# Patient Record
Sex: FEMALE | Race: BLACK OR AFRICAN AMERICAN | Employment: FULL TIME | URBAN - METROPOLITAN AREA
[De-identification: names, ages, dates, MRNs, and addresses within clinical notes are randomized per-mention and may not be internally consistent; named-entity substitution may affect disease eponyms.]

---
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
## 2021-10-05 ENCOUNTER — OFFICE VISIT (OUTPATIENT)
Dept: OBGYN | Age: 21
End: 2021-10-05
Payer: COMMERCIAL

## 2021-10-05 VITALS
SYSTOLIC BLOOD PRESSURE: 137 MMHG | HEART RATE: 107 BPM | TEMPERATURE: 98.1 F | BODY MASS INDEX: 37.73 KG/M2 | DIASTOLIC BLOOD PRESSURE: 82 MMHG | WEIGHT: 213 LBS

## 2021-10-05 DIAGNOSIS — Z20.2 POTENTIAL EXPOSURE TO STD: Primary | ICD-10-CM

## 2021-10-05 PROCEDURE — G8484 FLU IMMUNIZE NO ADMIN: HCPCS | Performed by: STUDENT IN AN ORGANIZED HEALTH CARE EDUCATION/TRAINING PROGRAM

## 2021-10-05 PROCEDURE — 1036F TOBACCO NON-USER: CPT | Performed by: STUDENT IN AN ORGANIZED HEALTH CARE EDUCATION/TRAINING PROGRAM

## 2021-10-05 PROCEDURE — 99203 OFFICE O/P NEW LOW 30 MIN: CPT | Performed by: STUDENT IN AN ORGANIZED HEALTH CARE EDUCATION/TRAINING PROGRAM

## 2021-10-05 PROCEDURE — G8417 CALC BMI ABV UP PARAM F/U: HCPCS | Performed by: STUDENT IN AN ORGANIZED HEALTH CARE EDUCATION/TRAINING PROGRAM

## 2021-10-05 PROCEDURE — 99204 OFFICE O/P NEW MOD 45 MIN: CPT | Performed by: STUDENT IN AN ORGANIZED HEALTH CARE EDUCATION/TRAINING PROGRAM

## 2021-10-05 PROCEDURE — G8427 DOCREV CUR MEDS BY ELIG CLIN: HCPCS | Performed by: STUDENT IN AN ORGANIZED HEALTH CARE EDUCATION/TRAINING PROGRAM

## 2021-10-05 NOTE — PROGRESS NOTES
Patientt is new to office and here today to establish care and have sti testing done. Patient denies any symptoms. Pelvic exam done by Dr. Sunshine Smith and GC/CT culture was obtained, labeled and sent to lab. Discharge instructions have been discussed with the patient by Dr. Sunshine Smith and patient voiced understanding. Patient advised to call our office with any questions or concerns.

## 2021-10-05 NOTE — PROGRESS NOTES
Gynecologic History and Physical       CHIEF COMPLAINT:  Annual, STD check    HISTORY OF PRESENT ILLNESS:      Rebecca Mg is a 22 y.o. [de-identified] female, who presents to establish care and for STD screening. She is not currently sexually actuve, but as been in the past few months and just wants to make sure. She usually does use condoms. She has never been on [facility] before. GYN: menarche at 16. Sexually active with a male partner at 24. Denies prior STDs. Never been on [facility] before. Periods are regular and come every month and last 5 days. Heavy during the first 2-3 days. Minimal cramps       Past Medical History:        Diagnosis Date    Allergic     seasonal    Hypertension        Past Surgical History:        Procedure Laterality Date    ADENOIDECTOMY      TONSILLECTOMY         Allergies:  Seasonal    Social History:    Social History     Socioeconomic History    Marital status: Single     Spouse name: Not on file    Number of children: Not on file    Years of education: Not on file    Highest education level: Not on file   Occupational History    Not on file   Tobacco Use    Smoking status: Never Smoker    Smokeless tobacco: Never Used   Vaping Use    Vaping Use: Every day    Substances: Nicotine   Substance and Sexual Activity    Alcohol use: No    Drug use: No    Sexual activity: Not on file   Other Topics Concern    Not on file   Social History Narrative    Not on file     Social Determinants of Health     Financial Resource Strain:     Difficulty of Paying Living Expenses:    Food Insecurity:     Worried About Running Out of Food in the Last Year:     Ran Out of Food in the Last Year:    Transportation Needs:     Lack of Transportation (Medical):      Lack of Transportation (Non-Medical):    Physical Activity:     Days of Exercise per Week:     Minutes of Exercise per Session:    Stress:     Feeling of Stress :    Social Connections:     Frequency of Communication with Friends and

## 2021-10-14 ENCOUNTER — OFFICE VISIT (OUTPATIENT)
Dept: INTERNAL MEDICINE | Age: 21
End: 2021-10-14
Payer: COMMERCIAL

## 2021-10-14 ENCOUNTER — CLINICAL DOCUMENTATION (OUTPATIENT)
Dept: INTERNAL MEDICINE | Age: 21
End: 2021-10-14

## 2021-10-14 VITALS
TEMPERATURE: 98.3 F | BODY MASS INDEX: 37.56 KG/M2 | HEART RATE: 96 BPM | HEIGHT: 63 IN | WEIGHT: 212 LBS | SYSTOLIC BLOOD PRESSURE: 144 MMHG | DIASTOLIC BLOOD PRESSURE: 89 MMHG

## 2021-10-14 DIAGNOSIS — Z23 INFLUENZA VACCINE ADMINISTERED: ICD-10-CM

## 2021-10-14 DIAGNOSIS — Z13.31 POSITIVE DEPRESSION SCREENING: Primary | ICD-10-CM

## 2021-10-14 DIAGNOSIS — G47.33 OSA (OBSTRUCTIVE SLEEP APNEA): ICD-10-CM

## 2021-10-14 DIAGNOSIS — Z72.89 OTHER PROBLEMS RELATED TO LIFESTYLE: ICD-10-CM

## 2021-10-14 DIAGNOSIS — Z11.59 NEED FOR HEPATITIS C SCREENING TEST: ICD-10-CM

## 2021-10-14 DIAGNOSIS — Z11.4 SCREENING FOR HIV WITHOUT PRESENCE OF RISK FACTORS: ICD-10-CM

## 2021-10-14 DIAGNOSIS — Z11.4 ENCOUNTER FOR SCREENING FOR HIV: ICD-10-CM

## 2021-10-14 DIAGNOSIS — Z00.00 ENCOUNTER FOR WELL ADULT EXAM WITHOUT ABNORMAL FINDINGS: ICD-10-CM

## 2021-10-14 DIAGNOSIS — I10 ESSENTIAL HYPERTENSION: ICD-10-CM

## 2021-10-14 PROCEDURE — 99212 OFFICE O/P EST SF 10 MIN: CPT

## 2021-10-14 PROCEDURE — G0008 ADMIN INFLUENZA VIRUS VAC: HCPCS

## 2021-10-14 PROCEDURE — 99385 PREV VISIT NEW AGE 18-39: CPT

## 2021-10-14 PROCEDURE — G8431 POS CLIN DEPRES SCRN F/U DOC: HCPCS

## 2021-10-14 PROCEDURE — 6360000002 HC RX W HCPCS

## 2021-10-14 PROCEDURE — 90686 IIV4 VACC NO PRSV 0.5 ML IM: CPT

## 2021-10-14 PROCEDURE — G8482 FLU IMMUNIZE ORDER/ADMIN: HCPCS

## 2021-10-14 RX ORDER — ADHESIVE BANDAGE 3/4"
1 BANDAGE TOPICAL DAILY
Qty: 1 EACH | Refills: 0 | Status: SHIPPED | OUTPATIENT
Start: 2021-10-14

## 2021-10-14 RX ORDER — BUPROPION HYDROCHLORIDE 150 MG/1
150 TABLET ORAL EVERY MORNING
Qty: 30 TABLET | Refills: 3 | Status: SHIPPED | OUTPATIENT
Start: 2021-10-14

## 2021-10-14 RX ORDER — AMLODIPINE BESYLATE 5 MG/1
5 TABLET ORAL DAILY
Qty: 30 TABLET | Refills: 1 | Status: SHIPPED
Start: 2021-10-14 | End: 2021-11-30 | Stop reason: SDUPTHER

## 2021-10-14 ASSESSMENT — PATIENT HEALTH QUESTIONNAIRE - PHQ9
4. FEELING TIRED OR HAVING LITTLE ENERGY: 3
7. TROUBLE CONCENTRATING ON THINGS, SUCH AS READING THE NEWSPAPER OR WATCHING TELEVISION: 0
SUM OF ALL RESPONSES TO PHQ QUESTIONS 1-9: 16
10. IF YOU CHECKED OFF ANY PROBLEMS, HOW DIFFICULT HAVE THESE PROBLEMS MADE IT FOR YOU TO DO YOUR WORK, TAKE CARE OF THINGS AT HOME, OR GET ALONG WITH OTHER PEOPLE: 1
SUM OF ALL RESPONSES TO PHQ QUESTIONS 1-9: 15
9. THOUGHTS THAT YOU WOULD BE BETTER OFF DEAD, OR OF HURTING YOURSELF: 1
2. FEELING DOWN, DEPRESSED OR HOPELESS: 2
5. POOR APPETITE OR OVEREATING: 3
8. MOVING OR SPEAKING SO SLOWLY THAT OTHER PEOPLE COULD HAVE NOTICED. OR THE OPPOSITE, BEING SO FIGETY OR RESTLESS THAT YOU HAVE BEEN MOVING AROUND A LOT MORE THAN USUAL: 0
6. FEELING BAD ABOUT YOURSELF - OR THAT YOU ARE A FAILURE OR HAVE LET YOURSELF OR YOUR FAMILY DOWN: 3
SUM OF ALL RESPONSES TO PHQ QUESTIONS 1-9: 16
SUM OF ALL RESPONSES TO PHQ9 QUESTIONS 1 & 2: 3
3. TROUBLE FALLING OR STAYING ASLEEP: 3
1. LITTLE INTEREST OR PLEASURE IN DOING THINGS: 1

## 2021-10-14 ASSESSMENT — ENCOUNTER SYMPTOMS
ABDOMINAL PAIN: 1
DIARRHEA: 0
SORE THROAT: 0
SHORTNESS OF BREATH: 1
VOMITING: 1
CHEST TIGHTNESS: 1
NAUSEA: 1
CONSTIPATION: 0

## 2021-10-14 NOTE — PATIENT INSTRUCTIONS
Thank you for coming to your follow up appointment   Please take your medications as directed and keep your follow up appointment in 11/18/21. Call our office if you have any questions or concerns at [phone]  Have blood work done prior to next appointment. Serjio Mckeon MD     Patient Education        Learning About High Blood Pressure  What is high blood pressure? Blood pressure is a measure of how hard the blood pushes against the walls of your arteries. It's normal for blood pressure to go up and down throughout the day. But if it stays up, you have high blood pressure. Another name for high blood pressure is hypertension. Two numbers tell you your blood pressure. The first number is the systolic pressure (top number). It shows how hard the blood pushes when your heart is pumping. The second number is the diastolic pressure (bottom number). It shows how hard the blood pushes between heartbeats, when your heart is relaxed and filling with blood. Your doctor will give you a goal for your blood pressure based on your health and your age. High blood pressure (hypertension) means that the top number stays high, or the bottom number stays high, or both. High blood pressure increases the risk of stroke, heart attack, and other problems. What happens when you have high blood pressure? · Blood flows through your arteries with too much force. Over time, this can damage the heart and the walls of your arteries. But you can't feel it. High blood pressure usually doesn't cause symptoms. · High blood pressure makes your heart work harder. And that can lead to heart failure, which means your heart doesn't pump as much blood as your body needs. · Fat and calcium start to build up in your arteries. This buildup is called hardening of the arteries. It can cause many problems including a heart attack and stroke. · Arteries also carry blood and oxygen to organs like your eyes, kidneys, and brain.  If high blood pressure damages those arteries, it can lead to vision loss, kidney disease, stroke, and a higher risk of dementia. How can you prevent high blood pressure? · Stay at a healthy weight. · Try to limit how much sodium you eat to less than 2,300 milligrams (mg) a day. If you limit your sodium to 1,500 mg a day, you can lower your blood pressure even more. ? Buy foods that are labeled \"unsalted,\" \"sodium-free,\" or \"low-sodium. \" Foods labeled \"reduced-sodium\" and \"light sodium\" may still have too much sodium. ? Flavor your food with garlic, lemon juice, onion, vinegar, herbs, and spices instead of salt. Do not use soy sauce, steak sauce, onion salt, garlic salt, mustard, or ketchup on your food. ? Use less salt (or none) when recipes call for it. You can often use half the salt a recipe calls for without losing flavor. · Be physically active. Get at least 30 minutes of exercise on most days of the week. Walking is a good choice. You also may want to do other activities, such as running, swimming, cycling, or playing tennis or team sports. · Limit alcohol to 2 drinks a day for men and 1 drink a day for women. · Eat plenty of fruits, vegetables, and low-fat dairy products. Eat less saturated and total fats. How is high blood pressure treated? · Your doctor will suggest making lifestyle changes to help your heart. For example, your doctor may ask you to eat healthy foods, quit smoking, lose extra weight, and be more active. · If lifestyle changes don't help enough, your doctor may recommend that you take medicine. · When blood pressure is very high, medicines are needed to lower it. Follow-up care is a key part of your treatment and safety. Be sure to make and go to all appointments, and call your doctor if you are having problems. It's also a good idea to know your test results and keep a list of the medicines you take. Where can you learn more? Go to [email or web address]://[email or web address].[email or web address]. org and sign in to your [employer] account. Enter P501 in the [employer] [place] box to learn more about \"Learning About High Blood Pressure. \"     If you do not have an account, please click on the \"Sign Up Now\" link. Current as of: April 29, 2021               Content Version: 13.0  © 6840-6783 Healthwise, Incorporated. Care instructions adapted under license by [facility] ([facility]). If you have questions about a medical condition or this instruction, always ask your healthcare professional. [address] any warranty or liability for your use of this information.

## 2021-10-14 NOTE — PROGRESS NOTES
Discharge instructions reviewed with patient per Dr. James Tinajero. Follow up appt scheduled and AVS given to patient.     Flu vaccine given IM right deltoid

## 2021-10-14 NOTE — PROGRESS NOTES
[facility] [address]  Internal Medicine Residency Program  [facility] Note      SUBJECTIVE:  CC: had concerns including New Patient and Referral - General (asking for referral for cardiology). HPI:  Gladis Owen is a 22 y. o.female with PMH of HTN, depression, and seasonal allergies presenting to [facility] to establish care. According to the patient, she has been having chest pain nearly everyday, describes it as sometimes stabbing, sometimes pressure-like, localized in the upper chest, associated with nausea and headaches. This is usually triggered when she is stressed or overwhelmed. Patient states that she also has no appetite, sometimes she has to force herself to eat. She states that this started when quarantined started, when she wasn't going out and meeting other people. Patient currently has a PHQ-9 score of 16 (moderately severe). She states she is open to see a counselor and start on anti-depressants. Patient also states that she might have sleep apnea. STOP-BANG score:4 (high risk). She is willing to have sleep studies done. Health maintenance:  Patient agreed for hep C, HIV screen and K, Crea monitoring, along with flu vaccination today    PMHx: hypertension - previously on amlodipine 5mg daily, stopped taking for 2 years since she moved to [place]. depression - not on any anti-depressantx, was previously seeing a psychiatrist; however, her psychiatrist moved to a different place and she refused to see a new one    FHx significant for HTN and DM on father side    SHx: uses vape everyday, non-alcoholic drinker, uses marijuana 5-6x/week (has used more previously), drinks coffee/tea occasionally    Review of Systems   Constitutional: Positive for appetite change and fatigue. Negative for fever. HENT: Negative for sore throat. Respiratory: Positive for chest tightness and shortness of breath. Cardiovascular: Positive for chest pain. Negative for palpitations and leg swelling. Gastrointestinal: Positive for abdominal pain, nausea and vomiting. Negative for constipation and diarrhea. Endocrine: Negative. Genitourinary: Negative for dyspareunia and menstrual problem. Musculoskeletal: Positive for arthralgias. Negative for joint swelling. Skin: Negative. Allergic/Immunologic: Positive for environmental allergies. Negative for food allergies. Neurological: Negative for dizziness and light-headedness. Hematological: Bruises/bleeds easily. Psychiatric/Behavioral: Negative for decreased concentration, self-injury and suicidal ideas. The patient is not nervous/anxious. Outpatient Medications Marked as Taking for the 10/14/21 encounter (Office Visit) with Edita Figueroa MD   Medication Sig Dispense Refill    amLODIPine (NORVASC) 5 MG tablet Take 1 tablet by mouth daily 30 tablet 1    buPROPion (WELLBUTRIN XL) 150 MG extended release tablet Take 1 tablet by mouth every morning 30 tablet 3    Blood Pressure Monitoring (BLOOD PRESSURE CUFF) MISC 1 Device by Does not apply route daily 1 each 0       OBJECTIVE:    VS:   BP (!) 144/89   Pulse 96   Temp 98.3 °F (36.8 °C) (Temporal)   Ht 5' 3\" (1.6 m)   Wt 212 lb (96.2 kg)   LMP 09/24/2021 (Exact Date)   HC 16 cm (6.3\")   BMI 37.55 kg/m²     EXAM:  Physical Exam  Constitutional:       Appearance: Normal appearance. She is obese. HENT:      Head: Normocephalic and atraumatic. Right Ear: External ear normal.      Left Ear: External ear normal.      Nose: Nose normal.      Mouth/Throat:      Mouth: Mucous membranes are moist.      Pharynx: Oropharynx is clear. Eyes:      Extraocular Movements: Extraocular movements intact. Conjunctiva/sclera: Conjunctivae normal.      Pupils: Pupils are equal, round, and reactive to light. Cardiovascular:      Rate and Rhythm: Normal rate and regular rhythm. Pulses: Normal pulses. Heart sounds: Normal heart sounds.    Pulmonary:      Effort: Pulmonary effort is normal.      Breath sounds: Normal breath sounds. Abdominal:      General: Abdomen is flat. Bowel sounds are normal.      Palpations: Abdomen is soft. Musculoskeletal:         General: Normal range of motion. Cervical back: Normal range of motion and neck supple. Skin:     General: Skin is warm. Capillary Refill: Capillary refill takes less than 2 seconds. Neurological:      General: No focal deficit present. Mental Status: She is alert and oriented to person, place, and time. Mental status is at baseline. Psychiatric:         Mood and Affect: Mood normal.         Behavior: Behavior normal.         Thought Content: Thought content normal.         Judgment: Judgment normal.         ASSESSMENT/PLAN:  I have reviewed all pertinent PMH, PSH, FH, SH, medications and allergies and updated history as appropriate. Luis Burciaga was seen today for new patient and referral - general.    Diagnoses and all orders for this visit:    Positive depression screening  - Positive Screen for Clinical Depression with a Documented Follow-up Plan   - Start buPROPion (WELLBUTRIN XL) 150 MG extended release tablet; Take 1 tablet by mouth every morning  - Won Gallegos LISW, [facility] [place] [facility] Only    Essential hypertension  - COMPREHENSIVE METABOLIC PANEL; Future  - Resume amLODIPine (NORVASC) 5 MG tablet; Take 1 tablet by mouth daily  - Blood Pressure Monitoring (BLOOD PRESSURE CUFF) MISC; 1 Device by Does not apply route daily  - Monitor blood pressures daily    AYUSH (obstructive sleep apnea)  - Baseline Diagnostic Sleep Study; Future    Encounter for screening for HIV  - HIV Screen; Future    Need for hepatitis C screening test  - Hepatitis C Antibody;  Future    Influenza vaccine administered  - INFLUENZA, QUADV, 3 YRS AND OLDER, IM PF, PREFILL SYR OR SDV, 0.5ML (AFLURIA QUADV, PF)    Encounter for well adult exam without abnormal findings     Other problems related to lifestyle  - Hepatitis C Antibody; Future    Screening for HIV without presence of risk factor  - HIV Screen; Future    RTC:  Follow-up in 1 month with labs (11/18/21).     I have reviewed my findings and recommendations with Rebecca Mg and Dr Lord Vu MD PGY-1  10/14/2021 4:51 PM

## 2021-10-14 NOTE — PROGRESS NOTES
MARIZOLW completed assessment, pt was cooperative and friendly throughout the assessment process. Pt reports moving back from [facility] 3 weeks ago. Pt is currently living with her mother and 3 younger siblings. Pt reports she left a negative situation and is glad to be back home. Pt identified feeling depressed as a result. Pt denies SI, denies HI, and denies hallucinations. Pt denies substance use and denies alcohol use. Pt reports a hx of treating at [place] in high school for anxiety and depression. Pt was agreeable to scheduling with [facility].      Pt scheduled for 10/21 at 1:00 pm in office    SW staffed with PCP

## 2021-10-14 NOTE — PROGRESS NOTES
[facility] [address]  Internal Medicine Residency Clinic    Attending Physician Statement  I have discussed the case, including pertinent history and exam findings with the resident physician. I have seen and examined the patient and the key elements of the encounter have been performed by me. I agree with the assessment, plan and orders as documented by the resident. I have reviewed all pertinent PMHx, PSHx, FamHx, SocialHx, medications, and allergies and updated history as appropriate. Patient here for new patient appointment to establish care. HTN: restarted amlodipine today    Depression: PHQ 9 score of 19; no SI/HI; no AH/VH; patient has multiple stressors occurring in her life after moving from [facility] to [facility]; patient unwilling to discuss situation with Krys Bacon today but stated she \"was not in a good situation\"; starting patient on wellbutrin 150 mg daily and patient will be seeing Silas Shepherd for counseling    Remainder of medical problems as per resident note.     [address] DO  10/14/2021 2:51 PM    Encounter time including independent chart review, discussion with patient, interpreting test results and/or external communications: 61'

## 2021-10-15 ENCOUNTER — TELEPHONE (OUTPATIENT)
Dept: INTERNAL MEDICINE | Age: 21
End: 2021-10-15

## 2021-10-15 NOTE — TELEPHONE ENCOUNTER
----- Message from Wally Reese sent at 10/15/2021 12:58 PM EDT -----  Subject: Message to Provider    QUESTIONS  Information for Provider? Patient is calling to let her PCP Dr Isael Khan know   that her insurance doesn't cover the BP cuff she ordered through the   pharmacy. Can this order be sent somewhere else? Please advise  ---------------------------------------------------------------------------  --------------  CALL BACK INFO  What is the best way for the office to contact you? OK to leave message on   voicemail  Preferred Call Back Phone Number? [phone]  ---------------------------------------------------------------------------  --------------  SCRIPT ANSWERS  Relationship to Patient?  Self

## 2021-10-18 ENCOUNTER — TELEPHONE (OUTPATIENT)
Dept: INTERNAL MEDICINE | Age: 21
End: 2021-10-18

## 2021-10-18 NOTE — TELEPHONE ENCOUNTER
bp monitor script escribed to pharmacy and can not be filled there  New script placed by dr mariela Campos and message left for pt to return call for options as to where she can have this script filled at

## 2021-10-20 ENCOUNTER — TELEPHONE (OUTPATIENT)
Dept: INTERNAL MEDICINE | Age: 21
End: 2021-10-20

## 2021-10-20 NOTE — TELEPHONE ENCOUNTER
Left patient a voicemail message due to needing to ask the questions on the sleep study questionnaire.

## 2021-10-21 ENCOUNTER — TELEPHONE (OUTPATIENT)
Dept: INTERNAL MEDICINE | Age: 21
End: 2021-10-21

## 2021-10-21 DIAGNOSIS — I10 ESSENTIAL HYPERTENSION: ICD-10-CM

## 2021-10-21 NOTE — TELEPHONE ENCOUNTER
----- Message from Manan Shah sent at 10/21/2021 11:27 AM EDT -----  Subject: Message to Provider    QUESTIONS  Information for Provider? patient wants to  prescribed cuffs for   blood pressure at a medical place, please call patient back   ---------------------------------------------------------------------------  --------------  [address]  What is the best way for the office to contact you? OK to leave message on   voicemail  Preferred Call Back Phone Number? [phone]  ---------------------------------------------------------------------------  --------------  SCRIPT ANSWERS  Relationship to Patient?  Self

## 2021-10-21 NOTE — PROGRESS NOTES
Pt requests her printed script to take to medical supply  Reprinted order under dr Aylsha Perrin name per his vo as ordering prescriber not available

## 2021-10-25 ENCOUNTER — TELEPHONE (OUTPATIENT)
Dept: INTERNAL MEDICINE | Age: 21
End: 2021-10-25

## 2021-10-25 NOTE — TELEPHONE ENCOUNTER
LISW contacted pt related to missed appointment. SW provided contact information for return call to reschedule.

## 2021-11-01 ENCOUNTER — OFFICE VISIT (OUTPATIENT)
Dept: INTERNAL MEDICINE | Age: 21
End: 2021-11-01
Payer: COMMERCIAL

## 2021-11-01 DIAGNOSIS — F43.10 POST TRAUMATIC STRESS DISORDER (PTSD): Primary | ICD-10-CM

## 2021-11-01 PROCEDURE — 90791 PSYCH DIAGNOSTIC EVALUATION: CPT | Performed by: SOCIAL WORKER

## 2021-11-01 ASSESSMENT — PATIENT HEALTH QUESTIONNAIRE - PHQ9
SUM OF ALL RESPONSES TO PHQ QUESTIONS 1-9: 19
8. MOVING OR SPEAKING SO SLOWLY THAT OTHER PEOPLE COULD HAVE NOTICED. OR THE OPPOSITE, BEING SO FIGETY OR RESTLESS THAT YOU HAVE BEEN MOVING AROUND A LOT MORE THAN USUAL: 0
4. FEELING TIRED OR HAVING LITTLE ENERGY: 3
SUM OF ALL RESPONSES TO PHQ9 QUESTIONS 1 & 2: 6
2. FEELING DOWN, DEPRESSED OR HOPELESS: 3
5. POOR APPETITE OR OVEREATING: 3
6. FEELING BAD ABOUT YOURSELF - OR THAT YOU ARE A FAILURE OR HAVE LET YOURSELF OR YOUR FAMILY DOWN: 3
3. TROUBLE FALLING OR STAYING ASLEEP: 3
10. IF YOU CHECKED OFF ANY PROBLEMS, HOW DIFFICULT HAVE THESE PROBLEMS MADE IT FOR YOU TO DO YOUR WORK, TAKE CARE OF THINGS AT HOME, OR GET ALONG WITH OTHER PEOPLE: 2
SUM OF ALL RESPONSES TO PHQ QUESTIONS 1-9: 18
9. THOUGHTS THAT YOU WOULD BE BETTER OFF DEAD, OR OF HURTING YOURSELF: 1
7. TROUBLE CONCENTRATING ON THINGS, SUCH AS READING THE NEWSPAPER OR WATCHING TELEVISION: 0
SUM OF ALL RESPONSES TO PHQ QUESTIONS 1-9: 19
1. LITTLE INTEREST OR PLEASURE IN DOING THINGS: 3

## 2021-11-01 ASSESSMENT — ANXIETY QUESTIONNAIRES
4. TROUBLE RELAXING: 0
2. NOT BEING ABLE TO STOP OR CONTROL WORRYING: 2
GAD7 TOTAL SCORE: 10
1. FEELING NERVOUS, ANXIOUS, OR ON EDGE: 1
7. FEELING AFRAID AS IF SOMETHING AWFUL MIGHT HAPPEN: 2
6. BECOMING EASILY ANNOYED OR IRRITABLE: 2
5. BEING SO RESTLESS THAT IT IS HARD TO SIT STILL: 1
IF YOU CHECKED OFF ANY PROBLEMS ON THIS QUESTIONNAIRE, HOW DIFFICULT HAVE THESE PROBLEMS MADE IT FOR YOU TO DO YOUR WORK, TAKE CARE OF THINGS AT HOME, OR GET ALONG WITH OTHER PEOPLE: SOMEWHAT DIFFICULT
3. WORRYING TOO MUCH ABOUT DIFFERENT THINGS: 2

## 2021-11-01 ASSESSMENT — COLUMBIA-SUICIDE SEVERITY RATING SCALE - C-SSRS
6. HAVE YOU EVER DONE ANYTHING, STARTED TO DO ANYTHING, OR PREPARED TO DO ANYTHING TO END YOUR LIFE?: NO
2. HAVE YOU ACTUALLY HAD ANY THOUGHTS OF KILLING YOURSELF?: NO
1. WITHIN THE PAST MONTH, HAVE YOU WISHED YOU WERE DEAD OR WISHED YOU COULD GO TO SLEEP AND NOT WAKE UP?: YES

## 2021-11-01 NOTE — PROGRESS NOTES
Remi Pretty MSW, LISW    Visit Date: 11/1/2021   Time of appointment:  11:08  Time spent with Patient: 40 minutes. This is patient's first appointment. Reason for Consult:  Depression     Referring Provider/PCP:    No ref. provider found  Serjio Mckeon MD      Pt provided informed consent for the behavioral health program. Discussed with patient model of service to include the limits of confidentiality (i.e. abuse reporting, suicide intervention, etc.) and short-term intervention focused approach. PRESENTING PROBLEM AND HISTORY  Vicky Ruiz is a 22 y.o. female who presents for new evaluation and treatment of depression. She has the following symptoms: feeling nervous, anxious, or on edge and feeling numb or detached. Onset of symptoms was approximately 3 months ago. Symptoms have been unchanged since that time. She denies current suicidal and homicidal ideation. Family history significant for no psychiatric illness. Risk factors: none. Previous treatment includes Wellbutrin. She complains of the following medication side effects: none. Pt reports challenges with depression, anxiety, nightmares, and flashbacks. MENTAL STATUS EXAM  Mood was euthymic with congruent affect. Suicidal ideation was denied. Homicidal ideation was denied. Hygiene was good . Dress was neat. Behavior was Within Normal Limits with No self-report of difficulties ambulating. Attitude was Cooperative, [place], Friendly and Help-seeking. Eye-contact was good. Speech: rate - WNL, rhythm -  WNL, volume - WNL  Verbalizations were goal directed. Thought processes were intact and goal-oriented without evidence of delusions, hallucinations, obsessions, or jenni; with little cognitive distortions. Associations were characterized by intact cognitive processes. Pt was oriented to person, place, time, and general circumstances;  recent:  good.   Insight and judgment were estimated to be good, AEB, a good  understanding of cyclical maladaptive patterns, and the ability to use insight to inform behavior change. CURRENT MEDICATIONS    Current Outpatient Medications:     Misc. Devices MISC, BP cuff Diagnosis:  Essential HTN ICD I-10, Disp: 1 each, Rfl: 0    amLODIPine (NORVASC) 5 MG tablet, Take 1 tablet by mouth daily, Disp: 30 tablet, Rfl: 1    buPROPion (WELLBUTRIN XL) 150 MG extended release tablet, Take 1 tablet by mouth every morning, Disp: 30 tablet, Rfl: 3    Blood Pressure Monitoring (BLOOD PRESSURE CUFF) MISC, 1 Device by Does not apply route daily, Disp: 1 each, Rfl: 0     FAMILY MEDICAL/MH HISTORY   Her family history includes Cancer in her maternal grandmother and paternal grandmother; Diabetes in her father; High Blood Pressure in her mother and sister; Other (age of onset: 28) in her mother. PATIENT MENTAL HEALTH HISTORY  Pt reports a hx of depression on and off for years    PSYCHOSOCIAL HISTORY  Current living situation: Pt currently lives with mother and three younger siblings    Work/Education: Pt currently works at [employer]: Pt reports support system is there but she does not always feel comfortable expressing self    Uatsdin/Spirituality: Pt reports she is not Taoist      DRUG AND ALCOHOL CURRENT USE/HISTORY  TOBACCO:  She reports that she has never smoked. She has never used smokeless tobacco.  ALCOHOL:  She reports no history of alcohol use. OTHER SUBSTANCES: She reports no history of drug use. ASSESSMENT  Ricco Flowers presented to the appointment today for evaluation and treatment of symptoms of anxiety and depression. She is currently deemed no risk to herself or others and meets criteria for PTSD. She will benefit from a medication evaluation to assess if  medications could be helpful in treating symptoms. Pt's symptoms are well controlled at this time.   She will also benefit from brief and solution-focused

## 2021-11-11 ENCOUNTER — OFFICE VISIT (OUTPATIENT)
Dept: INTERNAL MEDICINE | Age: 21
End: 2021-11-11
Payer: COMMERCIAL

## 2021-11-11 DIAGNOSIS — F43.10 POST TRAUMATIC STRESS DISORDER (PTSD): Primary | ICD-10-CM

## 2021-11-11 PROCEDURE — 90837 PSYTX W PT 60 MINUTES: CPT | Performed by: SOCIAL WORKER

## 2021-11-11 NOTE — PROGRESS NOTES
ADULT BEHAVIORAL HEALTH FOLLOW UP  Saul Gifford      Visit Date: 11/11/2021   Time of appointment:  2:30  Time spent with Patient: 60 minutes. This is patient's second appointment. Reason for Consult:  Depression     Referring Provider/PCP:    No ref. provider found  Paul Garcia MD      Pt provided informed consent for the behavioral health program. Discussed with patient model of service to include the limits of confidentiality (i.e. abuse reporting, suicide intervention, etc.) and short-term intervention focused approach. Pt indicated understanding. Vicky Cabral is a 22 y.o. female who presents for follow up of ptsd. Pt reports continued depression and challenges motivating self to do anything outside of work. Pt denies SI, denies HI, and denies hallucinations. MENTAL STATUS EXAM  Mood was depressed with congruent affect. Suicidal ideation was denied. Homicidal ideation was denied. Hygiene was good . Dress was neat. Behavior was Within Normal Limits with No self-report of difficulties ambulating. Attitude was Cooperative, [place] and [employer] was good. Speech: rate - WNL, rhythm - WNL, volume - WNL. Verbalizations were goal directed. Thought processes were intact and goal-oriented without evidence of delusions, hallucinations, obsessions, or jenni; with little cognitive distortions. Associations were characterized by intact cognitive processes. Pt was oriented to person, place, time, and general circumstances;  recent:  good. Insight and judgment were estimated to be good, AEB, a good  understanding of cyclical maladaptive patterns, and the ability to use insight to inform behavior change. ASSESSMENT  Sherie Ruiz presented to the appointment today for evaluation and treatment of symptoms of ptsd. She is currently deemed no risk to herself or others and meets criteria for ptsd. Pt was in agreement with recommendations.  Pt reports continued depression and challenges expressing self when feeling upset. LISW and pt explored and discussed triggers to isolation and depression. Pt and SW practiced grounding techniques and small ways to improve mood throughout the course of the day. Pt did agree to work on those skills before next session. LISW discussed safety planning. Pt reports at times she has thought about SI with no plan or intent. LISW educated pt if these thoughts worsen to present to Emergency Department for an evaluation pt agreed to plan. Pt currently denies SI. LISW and pt continued to discuss and explore strengths pt has and ways to practice coping skills. Pt was help-seeking throughout the session and expressed self openly. PHQ Scores 11/1/2021 10/14/2021 10/15/2018 10/13/2017 6/28/2016   PHQ2 Score 6 3 1 1 2   PHQ9 Score 19 16 5 6 13     Interpretation of Total Score Depression Severity: 1-4 = Minimal depression, 5-9 = Mild depression, 10-14 = Moderate depression, 15-19 = Moderately severe depression, 20-27 = Severe depression    How often pt has had thoughts of death or hurting self (if PHQ positive for depression):       CATARINA 7 SCORE 11/1/2021   CATARINA-7 Total Score 10     Interpretation of CATARINA-7 score: 5-9 = mild anxiety, 10-14 = moderate anxiety, 15+ = severe anxiety. Recommend referral to behavioral health for scores 10 or greater. DIAGNOSIS  Jocelynpavan was seen today for depression. Diagnoses and all orders for this visit:    Post traumatic stress disorder (PTSD)          INTERVENTION  Discussed and processed irrational thinking  Identified strengths related to progress  Completed safety planning related to PTSD symptoms  Reviewed session with client      PLAN  Pt to journal feelings and thought patterns  Pt to log mood      INTERACTIVE COMPLEXITY  Is interactive complexity present?   No  Reason:  N/A  Additional Supporting Information:  N/A       Electronically signed by PRIYA Pearson on 11/11/21 at 2:36 PM EST

## 2021-11-30 ENCOUNTER — TELEPHONE (OUTPATIENT)
Dept: INTERNAL MEDICINE | Age: 21
End: 2021-11-30

## 2021-11-30 ENCOUNTER — HOSPITAL ENCOUNTER (OUTPATIENT)
Age: 21
Discharge: HOME | End: 2021-11-30
Payer: COMMERCIAL

## 2021-11-30 ENCOUNTER — OFFICE VISIT (OUTPATIENT)
Dept: INTERNAL MEDICINE | Age: 21
End: 2021-11-30
Payer: COMMERCIAL

## 2021-11-30 VITALS
TEMPERATURE: 97.9 F | DIASTOLIC BLOOD PRESSURE: 80 MMHG | OXYGEN SATURATION: 100 % | WEIGHT: 221 LBS | RESPIRATION RATE: 20 BRPM | HEIGHT: 63 IN | HEART RATE: 90 BPM | SYSTOLIC BLOOD PRESSURE: 132 MMHG | BODY MASS INDEX: 39.16 KG/M2

## 2021-11-30 DIAGNOSIS — Z11.4 ENCOUNTER FOR SCREENING FOR HIV: ICD-10-CM

## 2021-11-30 DIAGNOSIS — Z11.59 NEED FOR HEPATITIS C SCREENING TEST: ICD-10-CM

## 2021-11-30 DIAGNOSIS — I10 ESSENTIAL HYPERTENSION: ICD-10-CM

## 2021-11-30 DIAGNOSIS — Z72.89 OTHER PROBLEMS RELATED TO LIFESTYLE: ICD-10-CM

## 2021-11-30 DIAGNOSIS — Z11.4 SCREENING FOR HIV WITHOUT PRESENCE OF RISK FACTORS: ICD-10-CM

## 2021-11-30 DIAGNOSIS — F43.10 POST TRAUMATIC STRESS DISORDER (PTSD): Primary | ICD-10-CM

## 2021-11-30 DIAGNOSIS — F32.A DEPRESSION, UNSPECIFIED DEPRESSION TYPE: ICD-10-CM

## 2021-11-30 LAB
ALBUMIN SERPL-MCNC: 4.3 G/DL (ref 3.5–5.2)
ALP BLD-CCNC: 45 U/L (ref 35–104)
ALT SERPL-CCNC: 19 U/L (ref 0–32)
ANION GAP SERPL CALCULATED.3IONS-SCNC: 12 MMOL/L (ref 7–16)
AST SERPL-CCNC: 18 U/L (ref 0–31)
BILIRUB SERPL-MCNC: 0.3 MG/DL (ref 0–1.2)
BUN BLDV-MCNC: 11 MG/DL (ref 6–20)
CALCIUM SERPL-MCNC: 8.9 MG/DL (ref 8.6–10.2)
CHLORIDE BLD-SCNC: 105 MMOL/L (ref 98–107)
CO2: 24 MMOL/L (ref 22–29)
CREAT SERPL-MCNC: 0.7 MG/DL (ref 0.5–1)
GFR AFRICAN AMERICAN: >60
GFR NON-AFRICAN AMERICAN: >60 ML/MIN/1.73
GLUCOSE BLD-MCNC: 81 MG/DL (ref 74–99)
POTASSIUM SERPL-SCNC: 4.2 MMOL/L (ref 3.5–5)
SODIUM BLD-SCNC: 141 MMOL/L (ref 132–146)
TOTAL PROTEIN: 7 G/DL (ref 6.4–8.3)

## 2021-11-30 PROCEDURE — 86703 HIV-1/HIV-2 1 RESULT ANTBDY: CPT

## 2021-11-30 PROCEDURE — G8417 CALC BMI ABV UP PARAM F/U: HCPCS | Performed by: INTERNAL MEDICINE

## 2021-11-30 PROCEDURE — G8427 DOCREV CUR MEDS BY ELIG CLIN: HCPCS | Performed by: INTERNAL MEDICINE

## 2021-11-30 PROCEDURE — G8482 FLU IMMUNIZE ORDER/ADMIN: HCPCS | Performed by: INTERNAL MEDICINE

## 2021-11-30 PROCEDURE — 99212 OFFICE O/P EST SF 10 MIN: CPT | Performed by: INTERNAL MEDICINE

## 2021-11-30 PROCEDURE — 99213 OFFICE O/P EST LOW 20 MIN: CPT | Performed by: INTERNAL MEDICINE

## 2021-11-30 PROCEDURE — 80053 COMPREHEN METABOLIC PANEL: CPT

## 2021-11-30 PROCEDURE — 90832 PSYTX W PT 30 MINUTES: CPT | Performed by: SOCIAL WORKER

## 2021-11-30 PROCEDURE — 36415 COLL VENOUS BLD VENIPUNCTURE: CPT

## 2021-11-30 PROCEDURE — 1036F TOBACCO NON-USER: CPT | Performed by: INTERNAL MEDICINE

## 2021-11-30 PROCEDURE — 86803 HEPATITIS C AB TEST: CPT

## 2021-11-30 RX ORDER — AMLODIPINE BESYLATE 5 MG/1
5 TABLET ORAL DAILY
Qty: 30 TABLET | Refills: 5 | Status: SHIPPED
Start: 2021-11-30 | End: 2022-04-21 | Stop reason: ALTCHOICE

## 2021-11-30 ASSESSMENT — ENCOUNTER SYMPTOMS
CONSTIPATION: 0
VOMITING: 0
DIARRHEA: 0
WHEEZING: 0
COUGH: 0
SINUS PRESSURE: 0
SHORTNESS OF BREATH: 0
NAUSEA: 0
SINUS PAIN: 0
BACK PAIN: 0

## 2021-11-30 NOTE — PATIENT INSTRUCTIONS
Please take meds a s prescribed  Please get blood work done  Follow up with Dr. Alyssa Parnell in 1 month    [address], DO    Patient Education        Diet and Exercise for Metabolic Syndrome: Care Instructions  Your Care Instructions     Metabolic syndrome is the name for a group of health problems. It includes having too much fat around your waist and high blood pressure. It also includes high triglycerides, high blood sugar, and low levels of healthy (HDL) cholesterol. These problems make it more likely you will have a heart attack or stroke or get diabetes. Your family history (your genes) can cause metabolic syndrome. So can unhealthy eating habits and not getting enough exercise. You can help lower your risk of heart attack, stroke, and diabetes if you eat healthy foods and get more exercise. It may be hard to make these lifestyle changes. But even small changes can help. Follow-up care is a key part of your treatment and safety. Be sure to make and go to all appointments, and call your doctor if you are having problems. It's also a good idea to know your test results and keep a list of the medicines you take. How can you care for yourself at home? Eat more fruits and vegetables  · Fruits and vegetables have nutrients to help protect you from heart disease and high blood pressure. They are low in fat and high in fiber. Dark green, orange, and yellow ones are the healthiest.  · Keep lots of vegetables ready for snacks. · Buy fruit that is in season. Then put it where you can see it so you will want to eat it. · Cook dishes that have a lot of vegetables. Soups and stir-fries are good choices. Limit saturated fats  · Read food labels, and try to avoid saturated fats. They increase your risk of heart disease. · Use olive or canola oil when you cook. · Bake, broil, grill, or steam foods. Avoid fried foods. · Limit how much high-fat meat you eat. This includes hot dogs and sausages.  When you prepare meat, cut off all the fat. · You can replace high-fat meat with fish and skinless poultry. You can also try products made from soybeans, like tofu. Soybeans may be very good for your heart. · Choose low-fat or fat-free milk and dairy products. Eat foods high in fiber  · Foods high in fiber may reduce your cholesterol. And they may give you important vitamins and minerals. Good examples are oatmeal, cooked dried beans, brown rice, citrus fruits, and apples. · Eat whole-grain breads and cereals. They have more fiber than white bread or pastries. Limit high-sugar foods  · Limit foods and drinks that are high in sugar. Some examples are soda pop, sugar-sweetened fruit drinks, candy, and many desserts. · Limit the amount of sugar, honey, and other sweeteners that you add to food and drinks. · Choose water instead of soda pop or other sugar-sweetened drinks. · Limit fruit juice. Limit salt and sodium  · You can help lower your blood pressure if you limit salt and sodium. · If you take the salt shaker off the table, it may be easier to use less salt. You can also try using half the salt in a recipe. And you can avoid adding salt to cooking water for pasta, rice, and potatoes. · Try to eat fewer snacks, fast foods, and other high-salt, processed foods. Check labels so you know how much sodium a food has. · Choose canned goods (soups, vegetables, and beans) that are low in sodium. Get regular exercise  · Get more exercise. Make sure your doctor knows when you start a new exercise program. Even small amounts of exercise will help you get stronger and have more energy. It can also help you manage your weight and your stress. · Walking is a good choice. Little by little, increase the amount you walk every day. Try for at least 30 minutes on most days of the week. Where can you learn more? Go to https://elisa.healthDataRobot. org and sign in to your [employer] account.  Enter 558 [id number] in the [address] [place] [address] Information box to learn more about \"Diet and Exercise for Metabolic Syndrome: Care Instructions. \"     If you do not have an account, please click on the \"Sign Up Now\" link. Current as of: December 17, 2020               Content Version: 13.0  © 5190-1504 Healthwise, Incorporated. Care instructions adapted under license by [facility] ([facility]). If you have questions about a medical condition or this instruction, always ask your healthcare professional. [address] any warranty or liability for your use of this information.

## 2021-11-30 NOTE — PROGRESS NOTES
[address] [place] [address],[address]  Internal Medicine Residency Clinic    Attending Physician Statement  I have discussed the case, including pertinent history and exam findings with the resident physician. I agree with the assessment, plan and orders as documented by the resident. I have reviewed all pertinent PMHx, PSHx, FamHx, SocialHx, medications, and allergies and updated history as appropriate. Patient presents for routine follow up of medical problems. HTN - controlled on current medications. Continue amlodipine   Await lab results    Depression - patient still with symptoms though did not want to discuss with resident today. Continue follow-up with PRIYA Farnsworth   Adjust medications and consider psychiatry referral if no improvement in mood/affect at next visit. Remainder of medical problems as per resident note.     Raimundo Bronson MD  11/30/2021 12:06 PM

## 2021-11-30 NOTE — PROGRESS NOTES
[address]  InternalMedicine Residency Program  [facility] Note    54  SUBJECTIVE:  CC: had concerns including Hypertension. HPI:Emma Blount presented to the [facility] for a routine visit. PMHx of  has a past medical history of Allergic, Depression, and Hypertension. Patient presents for regular follow up and med refill. CC: seasonal allergies-pollen dust mold; rhinorrhea; burning itchy eyes; no fever, chills, no cough, no sputum production. Follows with Dr. Quinton Dance (allergy-immunology specialist)    HTN: on amlodipine 5mg; 132/80 today but she did not take her meds yet. Has a cuff at home but does not check BP. Counseled about maintaining a log  Depression: was started on wellbutrin last visit; no change in symptoms; Follows with Ms. Yann Christianson; recommeded Trauma therapist for PTSD. Patient unwilling to discuss her symptoms/past with me today. Did not get blood work done. She will get it done in the next few weeks. [employer] 2/8 ()    Smokes Mileds 1to 2 daily; rare alcohol; daily marijuana use. Works at [employer]. From [facility] [address]; moved to [facility] due to family. Did not covid vaccine; got flu vaccine this year. Review of Systems   Constitutional: Negative for activity change, appetite change, chills and fever. HENT: Negative for sinus pressure, sinus pain and sneezing. Respiratory: Negative for cough, shortness of breath and wheezing. Cardiovascular: Negative for chest pain, palpitations and leg swelling. Gastrointestinal: Negative for constipation, diarrhea, nausea and vomiting. Endocrine: Negative for polydipsia, polyphagia and polyuria. Genitourinary: Negative for difficulty urinating, dysuria and hematuria. Musculoskeletal: Negative for arthralgias, back pain and myalgias. Neurological: Negative for dizziness, seizures, syncope and light-headedness. Psychiatric/Behavioral: Negative for dysphoric mood and sleep disturbance.  The patient is not Social Hx, medications, and allergies andupdated history as appropriate. Florence Burns was seen today for hypertension. Diagnoses and all orders for this visit:    Essential hypertension  -     amLODIPine (NORVASC) 5 MG tablet; Take 1 tablet by mouth daily    Depression, unspecified depression type    Please take meds a s prescribed  Please get blood work done  Follow up with Dr. Adelaida De Loen in 1 month    RTC:     I have reviewed my findings andrecommendations with Syed Lopez and attending physician.     Shelley Parkinson DO, DO PGY3  11/30/2021 12:07 PM

## 2021-11-30 NOTE — TELEPHONE ENCOUNTER
MARIZOLW left message for pt related to appointment. LISW provided contact information for return call.

## 2021-11-30 NOTE — PROGRESS NOTES
ADULT BEHAVIORAL HEALTH FOLLOW UP  Gerry Salcedo      Visit Date: 11/30/2021   Time of appointment:  10:30  Time spent with Patient: 30  minutes. This is patient's third appointment. Reason for Consult:  Depression     Referring Provider/PCP:    No ref. provider found  Vesta Primrose, MD      Pt provided informed consent for the behavioral health program. Discussed with patient model of service to include the limits of confidentiality (i.e. abuse reporting, suicide intervention, etc.) and short-term intervention focused approach. Pt indicated understanding. Oren Tate is a 22 y.o. female who presents for follow up of PTSD. Pt reports progress with mood and using coping skills. MENTAL STATUS EXAM  Mood was euthymic with congruent affect. Suicidal ideation was denied. Homicidal ideation was denied. Hygiene was good . Dress was neat. Behavior was Within Normal Limits with No self-report of difficulties ambulating. Attitude was Cooperative, [place] and Friendly. Eye-contact was good. Speech: rate - WNL, rhythm - WNL, volume - WNL. Verbalizations were goal directed. Thought processes were intact and goal-oriented without evidence of delusions, hallucinations, obsessions, or jenni; with little cognitive distortions. Associations were characterized by intact cognitive processes. Pt was oriented to person, place, time, and general circumstances;  recent:  good. Insight and judgment were estimated to be good, AEB, a good  understanding of cyclical maladaptive patterns, and the ability to use insight to inform behavior change. ASSESSMENT  Chao Heredia presented to the appointment today for evaluation and treatment of symptoms of PSTD. She is currently deemed no risk to herself or others and meets criteria for PTSD. Pt was in agreement with recommendations. Discussed and processed with pt progress with overall mood.  Explored with pt continued ways to express self and work towards making small changes to improve overall mood. Pt was agreeable and help-seeking throughout the session. PHQ Scores 11/1/2021 10/14/2021 10/15/2018 10/13/2017 6/28/2016   PHQ2 Score 6 3 1 1 2   PHQ9 Score 19 16 5 6 13     Interpretation of Total Score Depression Severity: 1-4 = Minimal depression, 5-9 = Mild depression, 10-14 = Moderate depression, 15-19 = Moderately severe depression, 20-27 = Severe depression    How often pt has had thoughts of death or hurting self (if PHQ positive for depression):       CATARINA 7 SCORE 11/1/2021   CATARINA-7 Total Score 10     Interpretation of CATARINA-7 score: 5-9 = mild anxiety, 10-14 = moderate anxiety, 15+ = severe anxiety. Recommend referral to behavioral health for scores 10 or greater. DIAGNOSIS  Emma was seen today for depression. Diagnoses and all orders for this visit:    Post traumatic stress disorder (PTSD)          INTERVENTION  Used CBT to address thinking patterns  Identified continued ways to use coping skills to improve mood      PLAN  Pt to be seen in one week  Pt to continue to practice coping skills and challenge irrational thinking pattern      INTERACTIVE COMPLEXITY  Is interactive complexity present?   No  Reason:  N/A  Additional Supporting Information:  N/A       Electronically signed by Mili Nuñez on 11/30/21 at 1:56 PM EST

## 2021-12-01 LAB
HEPATITIS C ANTIBODY INTERPRETATION: NORMAL
HIV-1 AND HIV-2 ANTIBODIES: NORMAL

## 2021-12-07 ENCOUNTER — TELEPHONE (OUTPATIENT)
Dept: INTERNAL MEDICINE | Age: 21
End: 2021-12-07

## 2021-12-07 NOTE — TELEPHONE ENCOUNTER
LISW left message for pt related to missed appointment. LISW provided contact information for return call.

## 2022-01-06 ENCOUNTER — TELEPHONE (OUTPATIENT)
Dept: INTERNAL MEDICINE | Age: 22
End: 2022-01-06

## 2022-04-20 NOTE — PROGRESS NOTES
[facility] Internal Medicine      SUBJECTIVE:  Mine Sandoval (:  2000) is a 22 y.o. female here for evaluation of the following chief complaint(s):  Follow-up (states having chest pain on and on for 4 days) and Health Maintenance (declines Covid vaccine at present)    Last seen on 2021    Chest pain  · She has had intermittent chest pain for the past 4 days described as sharp/burning pain on the right side radiating to the back. She states this pain is worse on deep inspiration especially when she takes a deep breath when smoking her vape or marijuana blunts  · No history of heart disease  · BMI 38  · Vapes everyday > pods refilled every 2-3 weeks  · Marijuana daily > 1-2 blunts per day  · hba1c 5.3 on 10/15/2018  ·  on 2/15/2019    Bilateral inguinal fold rash  · States she has had erythematous, pruritic rashes in her bilateral inguinal folds for the past several months    Hypertension  · BP today 124/88  · Has not been taking amlodipine for the past 4 months    Depression  · Has not been taking Wellbutrin for the past 4 months  · Follows with social work counseling    Review of Systems   Constitutional: Negative for appetite change, chills and fever. Respiratory: Negative for cough, chest tightness and shortness of breath. Cardiovascular: Positive for chest pain. Negative for palpitations and leg swelling. Gastrointestinal: Negative for abdominal pain, constipation, diarrhea, nausea and vomiting. Genitourinary: Negative. Musculoskeletal: Negative. Skin: Positive for rash (Erythematous bilateral inguinal folds). Neurological: Negative for headaches.        Current Outpatient Medications on File Prior to Visit   Medication Sig Dispense Refill    buPROPion (WELLBUTRIN XL) 150 MG extended release tablet Take 1 tablet by mouth every morning (Patient not taking: Reported on 2022) 30 tablet 3    Blood Pressure Monitoring (BLOOD PRESSURE CUFF) MISC 1 Device by Does not apply route daily (Patient not taking: Reported on 4/21/2022) 1 each 0     No current facility-administered medications on file prior to visit. OBJECTIVE:    VS:   Vitals:    04/21/22 1401   BP: 124/88   Site: Left Upper Arm   Position: Sitting   Cuff Size: Large Adult   Pulse: 79   Resp: 18   Temp: 97.2 °F (36.2 °C)   TempSrc: Temporal   SpO2: 99%   Weight: 220 lb (99.8 kg)   Height: 5' 3\" (1.6 m)     Physical Exam  Constitutional:       General: She is not in acute distress. HENT:      Head: Normocephalic and atraumatic. Eyes:      Conjunctiva/sclera: Conjunctivae normal.      Pupils: Pupils are equal, round, and reactive to light. Neck:      Vascular: No carotid bruit. Cardiovascular:      Rate and Rhythm: Normal rate and regular rhythm. Pulses: Normal pulses. Heart sounds: Normal heart sounds. Comments: Reproducible tenderness on right chest worsened with deep inspiration  Pulmonary:      Effort: Pulmonary effort is normal.      Breath sounds: Normal breath sounds. Abdominal:      General: There is no distension. Palpations: Abdomen is soft. There is no mass. Tenderness: There is no abdominal tenderness. Musculoskeletal:         General: Normal range of motion. Cervical back: Neck supple. No muscular tenderness. Skin:     General: Skin is warm and dry. Findings: Rash (Erythematous rash on bilateral inguinal folds with some satellite lesions) present. Neurological:      General: No focal deficit present. Mental Status: She is alert and oriented to person, place, and time. Mental status is at baseline. Psychiatric:         Mood and Affect: Mood normal.         Behavior: Behavior normal.         Thought Content:  Thought content normal.         Judgment: Judgment normal.            ASSESSMENT/PLAN:    Costochondritis likely 2/2 smoking  · Start ibuprofen and lidocaine patch as needed for chest pain  · Advised benefits of smoking cessation    Bilateral inguinal intertrigo  · Start clotrimazole cream twice daily  · Advised patient to keep area dry and prevent moisture. Educational information provided. Hypertension  · BP controlled today while off medications for 4 months  · Continue to monitor off medications    Anxiety/depression  · Follows with counseling    RTC:  Return in about 6 months (around 10/21/2022). I have reviewed my findings and recommendations with Oh Mcdermott and Dr. Ezra Chaudhary.     MD Judah [address]ill 1499  4/21/2022 3:33 PM

## 2022-04-21 ENCOUNTER — SOCIAL WORK (OUTPATIENT)
Dept: INTERNAL MEDICINE | Age: 22
End: 2022-04-21

## 2022-04-21 ENCOUNTER — OFFICE VISIT (OUTPATIENT)
Dept: INTERNAL MEDICINE | Age: 22
End: 2022-04-21
Payer: COMMERCIAL

## 2022-04-21 VITALS
DIASTOLIC BLOOD PRESSURE: 88 MMHG | RESPIRATION RATE: 18 BRPM | HEART RATE: 79 BPM | SYSTOLIC BLOOD PRESSURE: 124 MMHG | WEIGHT: 220 LBS | HEIGHT: 63 IN | OXYGEN SATURATION: 99 % | TEMPERATURE: 97.2 F | BODY MASS INDEX: 38.98 KG/M2

## 2022-04-21 DIAGNOSIS — Z59.9 FINANCIAL DIFFICULTIES: ICD-10-CM

## 2022-04-21 DIAGNOSIS — R07.89 COSTOCHONDRAL CHEST PAIN: ICD-10-CM

## 2022-04-21 DIAGNOSIS — L30.4 INTERTRIGO: Primary | ICD-10-CM

## 2022-04-21 PROCEDURE — 99212 OFFICE O/P EST SF 10 MIN: CPT | Performed by: INTERNAL MEDICINE

## 2022-04-21 PROCEDURE — 1036F TOBACCO NON-USER: CPT | Performed by: INTERNAL MEDICINE

## 2022-04-21 PROCEDURE — 99213 OFFICE O/P EST LOW 20 MIN: CPT | Performed by: INTERNAL MEDICINE

## 2022-04-21 PROCEDURE — G8417 CALC BMI ABV UP PARAM F/U: HCPCS | Performed by: INTERNAL MEDICINE

## 2022-04-21 PROCEDURE — G8427 DOCREV CUR MEDS BY ELIG CLIN: HCPCS | Performed by: INTERNAL MEDICINE

## 2022-04-21 RX ORDER — LIDOCAINE 4 G/G
1 PATCH TOPICAL DAILY
Qty: 30 PATCH | Refills: 0 | Status: SHIPPED | OUTPATIENT
Start: 2022-04-21 | End: 2022-05-21

## 2022-04-21 RX ORDER — CLOTRIMAZOLE 1 %
CREAM (GRAM) TOPICAL
Qty: 1 EACH | Refills: 1 | Status: SHIPPED | OUTPATIENT
Start: 2022-04-21 | End: 2022-04-28

## 2022-04-21 RX ORDER — IBUPROFEN 600 MG/1
600 TABLET ORAL 4 TIMES DAILY PRN
Qty: 40 TABLET | Refills: 0 | Status: SHIPPED | OUTPATIENT
Start: 2022-04-21

## 2022-04-21 SDOH — ECONOMIC STABILITY: HOUSING INSECURITY: IN THE LAST 12 MONTHS, HOW MANY PLACES HAVE YOU LIVED?: 4

## 2022-04-21 SDOH — ECONOMIC STABILITY - INCOME SECURITY: PROBLEM RELATED TO HOUSING AND ECONOMIC CIRCUMSTANCES, UNSPECIFIED: Z59.9

## 2022-04-21 SDOH — ECONOMIC STABILITY: HOUSING INSECURITY
IN THE LAST 12 MONTHS, WAS THERE A TIME WHEN YOU DID NOT HAVE A STEADY PLACE TO SLEEP OR SLEPT IN A SHELTER (INCLUDING NOW)?: YES

## 2022-04-21 SDOH — ECONOMIC STABILITY: INCOME INSECURITY: IN THE LAST 12 MONTHS, WAS THERE A TIME WHEN YOU WERE NOT ABLE TO PAY THE MORTGAGE OR RENT ON TIME?: NO

## 2022-04-21 SDOH — ECONOMIC STABILITY: FOOD INSECURITY: WITHIN THE PAST 12 MONTHS, THE FOOD YOU BOUGHT JUST DIDN'T LAST AND YOU DIDN'T HAVE MONEY TO GET MORE.: NEVER TRUE

## 2022-04-21 SDOH — ECONOMIC STABILITY: TRANSPORTATION INSECURITY
IN THE PAST 12 MONTHS, HAS LACK OF TRANSPORTATION KEPT YOU FROM MEETINGS, WORK, OR FROM GETTING THINGS NEEDED FOR DAILY LIVING?: NO

## 2022-04-21 SDOH — ECONOMIC STABILITY: TRANSPORTATION INSECURITY
IN THE PAST 12 MONTHS, HAS THE LACK OF TRANSPORTATION KEPT YOU FROM MEDICAL APPOINTMENTS OR FROM GETTING MEDICATIONS?: NO

## 2022-04-21 SDOH — ECONOMIC STABILITY: FOOD INSECURITY: WITHIN THE PAST 12 MONTHS, YOU WORRIED THAT YOUR FOOD WOULD RUN OUT BEFORE YOU GOT MONEY TO BUY MORE.: NEVER TRUE

## 2022-04-21 ASSESSMENT — PATIENT HEALTH QUESTIONNAIRE - PHQ9
SUM OF ALL RESPONSES TO PHQ9 QUESTIONS 1 & 2: 2
DEPRESSION UNABLE TO ASSESS: YES
1. LITTLE INTEREST OR PLEASURE IN DOING THINGS: MORE THAN HALF THE DAYS
2. FEELING DOWN, DEPRESSED OR HOPELESS: NOT AT ALL

## 2022-04-21 ASSESSMENT — ENCOUNTER SYMPTOMS
COUGH: 0
SHORTNESS OF BREATH: 0
CHEST TIGHTNESS: 0
NAUSEA: 0
VOMITING: 0
ABDOMINAL PAIN: 0
CONSTIPATION: 0
DIARRHEA: 0

## 2022-04-21 ASSESSMENT — LIFESTYLE VARIABLES
HOW MANY STANDARD DRINKS CONTAINING ALCOHOL DO YOU HAVE ON A TYPICAL DAY: 1 OR 2
HOW OFTEN DO YOU HAVE A DRINK CONTAINING ALCOHOL: MONTHLY OR LESS

## 2022-04-21 ASSESSMENT — SOCIAL DETERMINANTS OF HEALTH (SDOH): HOW HARD IS IT FOR YOU TO PAY FOR THE VERY BASICS LIKE FOOD, HOUSING, MEDICAL CARE, AND HEATING?: SOMEWHAT HARD

## 2022-04-21 NOTE — PROGRESS NOTES
[address]  Internal Medicine Residency Clinic    Attending Physician Statement  I have discussed the case, including pertinent history and exam findings with the resident physician. I agree with the assessment, plan and orders as documented by the resident. I have reviewed all pertinent PMHx, PSHx, FamHx, SocialHx, medications, and allergies and updated history as appropriate. Patient presents for routine follow up of medical problems. Pleuritic chest pain, suspect costochondritis -- 6 days duration and reproducible on palpation, recommend NSAID; if persists beyond 1 week check CXR. Tobacco abuse disorder -- vaping, cigarettes and marijuana    Hx of HTN -- off amlodipine for 4 months and BP well controlled today in office    Remainder of medical problems as per resident note.     Daniel Villatoro DO  4/21/2022 2:43 PM

## 2022-04-21 NOTE — PROGRESS NOTES
AVS printed with follow up appointment and discharge instructions given per Dr. Mai Puente. Referral entered per patient request due to concerns on social determinants.

## 2022-04-21 NOTE — PATIENT INSTRUCTIONS
Patient Education        Yeast Skin Infection: Care Instructions  Your Care Instructions     Yeast normally lives on your skin. Sometimes too much yeast can overgrow in certain areas of the skin and cause an infection. The infection causes red,scaly, moist patches on your skin that may itch. Common areas for skin yeast infections are skin folds under the breasts or belly area. The warm and moist areas in the skin folds can make it easier for yeast to overgrow. Yeast infections also can be found on other parts of thebody such as the groin or armpits. You will probably get a cream or ointment that contains an antifungal medicine. Examples of these are miconazole and clotrimazole. You put it on your skin to treat the infection. Your doctor may give you a prescription for the cream or ointment. Or you may be [facility]. If the infection is severe, the doctor will prescribe antifungal pills. A yeast infection usually goes away after about a week of treatment. But it'simportant to use the medicine for as long as your doctor tells you to. Follow-up care is a key part of your treatment and safety. Be sure to make and go to all appointments, and call your doctor if you are having problems. It's also a good idea to know your test results and keep alist of the medicines you take. How can you care for yourself at home?  Be safe with medicines. Take your medicines exactly as prescribed. Call your doctor if you think you are having a problem with your medicine.  Keep your skin clean and dry. Your doctor may suggest using powder that contains an antifungal medicine in the skin folds.  Wear loose clothing. When should you call for help? Call your doctor now or seek immediate medical care if:     You have symptoms of infection, such as:  ? Increased pain, swelling, warmth, or redness. ? Red streaks leading from the area. ? Pus draining from the area. ? A fever. Watch closely for changes in your health, and be sure to contact your doctor if:     You do not get better as expected. Where can you learn more? Go to https://chpepiceweb.Azooo. org and sign in to your [employer] account. Enter K438 in the [facility] box to learn more about \"Yeast Skin Infection: Care Instructions. \"     If you do not have an account, please click on the \"Sign Up Now\" link. Current as of: November 15, 2021               Content Version: 13.2  © 2006-2022 [employer]. Care instructions adapted under license by [facility] ([facility]). If you have questions about a medical condition or this instruction, always ask your healthcare professional. [address] any warranty or liability for your use of this information. Patient Education        Musculoskeletal Chest Pain: Care Instructions  Your Care Instructions     Chest pain is not always a sign that something is wrong with your heart or that you have another serious problem. The doctor thinks your chest pain is caused by strained muscles or ligaments, inflamed chest cartilage, or another problem in your chest, rather than by your heart. You may need more tests to find thecause of your chest pain. Follow-up care is a key part of your treatment and safety. Be sure to make and go to all appointments, and call your doctor if you are having problems. It's also a good idea to know your test results and keep alist of the medicines you take. How can you care for yourself at home?  Take pain medicines exactly as directed. ? If the doctor gave you a prescription medicine for pain, take it as prescribed. ? If you are not taking a prescription pain medicine, ask your doctor if you can take an over-the-counter medicine.  Rest and protect the sore area.  Stop, change, or take a break from any activity that may be causing your pain or soreness.    Put ice or a cold pack on the sore area for 10 to 20 minutes at a time. Try to do this every 1 to 2 hours for the next 3 days (when you are awake) or until the swelling goes down. Put a thin cloth between the ice and your skin.  After 2 or 3 days, apply a heating pad set on low or a warm cloth to the area that hurts. Some doctors suggest that you go back and forth between hot and cold.  Do not wrap or tape your ribs for support. This may cause you to take smaller breaths, which could increase your risk of lung problems.  Mentholated creams such as Bengay or Icy Hot may soothe sore muscles. Follow the instructions on the package.  Follow your doctor's instructions for exercising.  Gentle stretching and massage may help you get better faster. Stretch slowly to the point just before pain begins, and hold the stretch for at least 15 to 30 seconds. Do this 3 or 4 times a day. Stretch just after you have applied heat.  As your pain gets better, slowly return to your normal activities. Any increased pain may be a sign that you need to rest a while longer. When should you call for help? Call 911 anytime you think you may need emergency care. For example, call if:     You have chest pain or pressure. This may occur with:  ? Sweating. ? Shortness of breath. ? Nausea or vomiting. ? Pain that spreads from the chest to the neck, jaw, or one or both shoulders or arms. ? Dizziness or lightheadedness. ? A fast or uneven pulse. After calling 911, chew 1 adult-strength aspirin. Wait for an ambulance. Do not try to drive yourself.      You have sudden chest pain and shortness of breath, or you cough up blood. Call your doctor now or seek immediate medical care if:     You have any trouble breathing.      Your chest pain gets worse.      Your chest pain occurs consistently with exercise and is relieved by rest.   Watch closely for changes in your health, and be sure to contact your doctor if:     Your chest pain does not get better after 1 week.    Where can you learn more? Go to https://chpepiceweb.healthKyma [email or web address] org and sign in to your [employer] account. Enter [id number] [id number] in the [facility] box to learn more about \"Musculoskeletal Chest Pain: Care Instructions. \"     If you do not have an account, please click on the \"Sign Up Now\" link. Current as of: July 1, 2021               Content Version: 13.2  © 2006-2022 Healthwise, [employer]. Care instructions adapted under license by [facility] ([facility]). If you have questions about a medical condition or this instruction, always ask your healthcare professional. [address] any warranty or liability for your use of this information.        Partner ibuprofen and lidocaine patch as needed for chest pain  Start clotrimazole cream for bilateral inguinal rash  Stop amlodipine

## 2022-04-25 NOTE — PROGRESS NOTES
Consult during 22 IM visit related to positive SDOH r/t housing instability and pt's request for assistance with Medicaid. Met with with pt during IM appt; she was unaccompanied to visit and travelled via car. Pt is single, never  and has no children. Pt born and raised in [place]; has lived in [place] since  and is highschool graduate. Presently residing in house which is now stable environment. Pt is employed parttime at [employer] and has [employer] which she reports is under her father's case and she desires to apply independently. Review of PA photo ID and name is Ascencion Drake and her Care[employer]ce is [place] [employer]. Pt states name is as phot ID. Pt does not know why she does not have own case as she is independent financially and over 20. Advised LSW will assist with application as she does not want her medical benefit to .   Advised of documentation she will need to provide in order to apply independently and pt to call LSW to schedule return visit with LSW when documents are available; LSW card provided to call and schedule

## 2022-05-20 ENCOUNTER — OFFICE VISIT (OUTPATIENT)
Dept: OBGYN | Age: 22
End: 2022-05-20
Payer: COMMERCIAL

## 2022-05-20 VITALS
BODY MASS INDEX: 39.68 KG/M2 | DIASTOLIC BLOOD PRESSURE: 72 MMHG | HEART RATE: 91 BPM | SYSTOLIC BLOOD PRESSURE: 129 MMHG | WEIGHT: 224 LBS

## 2022-05-20 DIAGNOSIS — L90.0 LICHEN SCLEROSUS: ICD-10-CM

## 2022-05-20 DIAGNOSIS — Z12.4 CERVICAL CANCER SCREENING: ICD-10-CM

## 2022-05-20 DIAGNOSIS — N89.8 VAGINAL ITCHING: Primary | ICD-10-CM

## 2022-05-20 DIAGNOSIS — L73.2 HYDRADENITIS: ICD-10-CM

## 2022-05-20 LAB
CLUE CELLS: NORMAL
SOURCE WET PREP: NORMAL
TRICHOMONAS PREP: NORMAL
YEAST WET PREP: NORMAL

## 2022-05-20 PROCEDURE — G8417 CALC BMI ABV UP PARAM F/U: HCPCS | Performed by: MIDWIFE

## 2022-05-20 PROCEDURE — G8427 DOCREV CUR MEDS BY ELIG CLIN: HCPCS | Performed by: MIDWIFE

## 2022-05-20 PROCEDURE — 99203 OFFICE O/P NEW LOW 30 MIN: CPT | Performed by: MIDWIFE

## 2022-05-20 PROCEDURE — 99214 OFFICE O/P EST MOD 30 MIN: CPT | Performed by: MIDWIFE

## 2022-05-20 PROCEDURE — 1036F TOBACCO NON-USER: CPT | Performed by: MIDWIFE

## 2022-05-20 ASSESSMENT — ENCOUNTER SYMPTOMS
RESPIRATORY NEGATIVE: 1
ALLERGIC/IMMUNOLOGIC NEGATIVE: 1
GASTROINTESTINAL NEGATIVE: 1
EYES NEGATIVE: 1

## 2022-05-20 NOTE — PROGRESS NOTES
Christine Mitchellgloria    Chief Complaint   Patient presents with    Annual Exam     vaginal soreness and paining x1 wk        C/o vaginal soreness and pain x 1 week  Burning, severe pain, swelling. Starting to improve now  She had this about a year ago, even more severe last time, could hardly walk it hurt so bad. Lasted about 2 weeks. Sexually active, uses condoms every time    She wants a derm referral for pimples on her bikini line and under her armpits      Past Medical History:   Diagnosis Date    Allergic     seasonal    Depression     Hypertension       Past Surgical History:   Procedure Laterality Date    ADENOIDECTOMY      TONSILLECTOMY        OB History        0    Para   0    Term   0       0    AB   0    Living   0       SAB   0    IAB   0    Ectopic   0    Molar   0    Multiple   0    Live Births   0               Current Outpatient Medications   Medication Sig Dispense Refill    ibuprofen (ADVIL;MOTRIN) 600 MG tablet Take 1 tablet by mouth 4 times daily as needed for Pain 40 tablet 0    lidocaine 4 % external patch Place 1 patch onto the skin daily 30 patch 0    buPROPion (WELLBUTRIN XL) 150 MG extended release tablet Take 1 tablet by mouth every morning (Patient not taking: Reported on 2022) 30 tablet 3    Blood Pressure Monitoring (BLOOD PRESSURE CUFF) MISC 1 Device by Does not apply route daily (Patient not taking: Reported on 2022) 1 each 0     No current facility-administered medications for this visit. Allergies   Allergen Reactions    Seasonal Itching     Runny nose, watery eyes        Review of Systems   Constitutional: Negative. HENT: Negative. Eyes: Negative. Respiratory: Negative. Cardiovascular: Negative. Gastrointestinal: Negative. Endocrine: Negative. Genitourinary: Positive for vaginal pain. Musculoskeletal: Negative. Skin:        Vulvar itching and pain   Allergic/Immunologic: Negative. Neurological: Negative. Hematological: Negative. Psychiatric/Behavioral: Negative. All other systems reviewed and are negative. Physical Exam  Constitutional:       Appearance: Normal appearance. Genitourinary:      Bladder, rectum and urethral meatus normal.      No lesions in the vagina. Right Labia: skin changes. Right Labia: No lesions. Left Labia: skin changes. Left Labia: No lesions. Vulva exam comments: Entire vulva very edematous, especially laboa minora. No redness or erythema, no breaks in the skin, skin is very white    Live insect removed with forceps and sent to pathology    Punch biopsy x 2 left labia. No vaginal prolapse present. No vaginal atrophy present. Right Adnexa: not tender, not full and no mass present. Left Adnexa: not tender, not full and no mass present. No cervical motion tenderness, discharge, friability, lesion or nabothian cyst.   Cardiovascular:      Rate and Rhythm: Normal rate. Pulmonary:      Effort: Pulmonary effort is normal.   Abdominal:      Palpations: Abdomen is soft. Musculoskeletal:         General: Normal range of motion. Neurological:      General: No focal deficit present. Mental Status: She is alert and oriented to person, place, and time. Skin:     General: Skin is warm and dry. Psychiatric:         Mood and Affect: Mood normal.         Behavior: Behavior normal.         Thought Content: Thought content normal.         Judgment: Judgment normal.   Vitals reviewed. Wet prep negative for yeast, bacterial vaginosis or trichomonas    Encounter Diagnoses   Name Primary?  Vaginal itching Yes    Hydradenitis     Cervical cancer screening     Lichen sclerosus          Orders Placed This Encounter   Procedures    WET PREP, GENITAL     Standing Status:   Future     Standing Expiration Date:   5/20/2023    PAP SMEAR     Patient History:    Patient's last menstrual period was 05/17/2022.   OBGYN Status: Having periods  Past Surgical History:  No date: ADENOIDECTOMY  No date: TONSILLECTOMY      Social History    Tobacco Use      Smoking status: Never Smoker      Smokeless tobacco: Never Used      Tobacco comment: vapes daily       Order Specific Question:   Collection Type     Answer:   Imaged Thin Prep     Order Specific Question:   Prior Abnormal Pap Test     Answer:   No     Order Specific Question:   Screening or Diagnostic     Answer:   Screening     Order Specific Question:   Additional STD Testing     Answer:   GC and Chlamydia     Order Specific Question:   Diagnosis Code for Additional STD Testing     Answer:   Screen for STD (sexually transmitted disease) (Z11.3)     Order Specific Question:   HPV Requested? Answer:   HPV if ASCUS    SURGICAL PATHOLOGY     Standing Status:   Future     Standing Expiration Date:   5/20/2023     Order Specific Question:   PREVIOUS BIOPSY     Answer:   No     Order Specific Question:   PREOP DIAGNOSIS     Answer:   lichen sclerosis     Order Specific Question:   FROZEN SECTION - NO OR YES/SPECIMEN     Answer:   No    SURGICAL PATHOLOGY     Standing Status:   Future     Standing Expiration Date:   5/20/2023     Order Specific Question:   PREVIOUS BIOPSY     Answer:   No     Order Specific Question:   PREOP DIAGNOSIS     Answer:   INSECT from perineal area     Order Specific Question:   FROZEN SECTION - NO OR YES/SPECIMEN     Answer:   No   Steffany Merida DO [facility] ([facility])     Referral Priority:   Routine     Referral Type:   Eval and Treat     Referral Reason:   Specialty Services Required     Referred to Provider:   Yamini Pantoja DO     Requested Specialty:   Dermatology     Number of Visits Requested:   1        Return if symptoms worsen or fail to improve.      RUSSELL Santizo CNM

## 2022-05-20 NOTE — PROGRESS NOTES
Patient is here today for annual exam, patient complains of vaginal soreness, itching and pain about 1 week ago. A wet prep was obtained and sent to lab stat. A pap smear was performed and will be sent to the lab along with a punch biopsy placed in a specimen container. A consent form was signed and will be scanned into patient's chart.

## 2022-05-24 LAB
CHLAMYDIA BY THIN PREP: NEGATIVE
N. GONORRHOEAE DNA, THIN PREP: NEGATIVE
SOURCE: NORMAL

## 2022-05-27 ENCOUNTER — TELEPHONE (OUTPATIENT)
Dept: OBGYN | Age: 22
End: 2022-05-27

## 2022-05-27 RX ORDER — CLOTRIMAZOLE AND BETAMETHASONE DIPROPIONATE 10; .64 MG/G; MG/G
CREAM TOPICAL
Qty: 45 G | Refills: 1 | Status: SHIPPED | OUTPATIENT
Start: 2022-05-27

## 2022-05-27 NOTE — RESULT ENCOUNTER NOTE
No lichen identified  Insect appears to be an ant    Rx for lotrimin cream, schedule recheck in 4 weeks

## 2022-06-01 LAB
HPV SAMPLE: ABNORMAL
HPV TYPE 16: NOT DETECTED
HPV TYPE 18: NOT DETECTED
HPV, HIGH RISK OTHER: DETECTED
INTERPRETATION: ABNORMAL
SOURCE: ABNORMAL

## 2022-06-20 ENCOUNTER — TELEPHONE (OUTPATIENT)
Dept: OBGYN | Age: 22
End: 2022-06-20

## 2022-06-24 ENCOUNTER — TELEPHONE (OUTPATIENT)
Dept: ADMINISTRATIVE | Age: 22
End: 2022-06-24

## 2022-06-24 NOTE — TELEPHONE ENCOUNTER
Per Timothy Sneed, patient needs a colposcopy. Kenzie Godinez unable to perform procedure. Called patient on 6/23 to cancel and reschedule. No answer, left a message on the voicemail.

## 2022-07-14 ENCOUNTER — PROCEDURE VISIT (OUTPATIENT)
Dept: OBGYN | Age: 22
End: 2022-07-14
Payer: COMMERCIAL

## 2022-07-14 VITALS
HEART RATE: 84 BPM | DIASTOLIC BLOOD PRESSURE: 76 MMHG | SYSTOLIC BLOOD PRESSURE: 138 MMHG | WEIGHT: 231 LBS | HEIGHT: 63 IN | RESPIRATION RATE: 16 BRPM | BODY MASS INDEX: 40.93 KG/M2

## 2022-07-14 DIAGNOSIS — R87.618 ABNORMAL PAPANICOLAOU SMEAR OF CERVIX WITH POSITIVE HUMAN PAPILLOMA VIRUS (HPV) TEST: Primary | ICD-10-CM

## 2022-07-14 LAB
CONTROL: NORMAL
PREGNANCY TEST URINE, POC: NORMAL

## 2022-07-14 PROCEDURE — 57452 EXAM OF CERVIX W/SCOPE: CPT | Performed by: OBSTETRICS & GYNECOLOGY

## 2022-07-14 PROCEDURE — 99213 OFFICE O/P EST LOW 20 MIN: CPT | Performed by: OBSTETRICS & GYNECOLOGY

## 2022-07-14 PROCEDURE — 81025 URINE PREGNANCY TEST: CPT | Performed by: OBSTETRICS & GYNECOLOGY

## 2022-07-14 NOTE — PROGRESS NOTES
This is new patient to me referred by Karina Armstrong for abn pap. Pap was ascus with a positive HPV, not 16 or 18. Rare atyptical cells were noted. Patient enters for a colposcopy today. Discussed the procedure as well as HPV in general and gave her a booklet on HPV. Permit obtained. Colposcopy Procedure Note    Indications: Pap smear 2 months ago showed: ASCUS with POSITIVE high risk HPV. The prior pap showed this was her first pap. Prior cervical/vaginal disease: none. . Prior cervical treatment: no treatment. Procedure Details   The risks and benefits of the procedure and Written informed consent obtained. Speculum placed in vagina and excellent visualization of cervix achieved, cervix swabbed x 3 with acetic acid solution. Findings:  Cervix: no visible lesions, no mosaicism, no punctation and no abnormal vasculature; SCJ visualized 360 degrees without lesions and no biopsies taken. Vaginal inspection: vaginal colposcopy not performed. Vulvar colposcopy: vulvar colposcopy not performed. Specimens: None    Complications: none. Plan:  Post biopsy instructions given to patient. RTC  For a repeat pap and HPV testing in 1 year. Encouraged patient to see her primary Care physician to possibly get Gardisil 9 Vaccine. Booklet on HPV given to patient.

## 2022-07-29 ENCOUNTER — TELEPHONE (OUTPATIENT)
Dept: INTERNAL MEDICINE | Age: 22
End: 2022-07-29

## 2022-08-30 ENCOUNTER — OFFICE VISIT (OUTPATIENT)
Dept: INTERNAL MEDICINE | Age: 22
End: 2022-08-30
Payer: COMMERCIAL

## 2022-08-30 VITALS
HEART RATE: 98 BPM | RESPIRATION RATE: 16 BRPM | OXYGEN SATURATION: 99 % | SYSTOLIC BLOOD PRESSURE: 133 MMHG | BODY MASS INDEX: 41.82 KG/M2 | DIASTOLIC BLOOD PRESSURE: 83 MMHG | HEIGHT: 63 IN | WEIGHT: 236 LBS | TEMPERATURE: 97.1 F

## 2022-08-30 DIAGNOSIS — F32.A DEPRESSION, UNSPECIFIED DEPRESSION TYPE: ICD-10-CM

## 2022-08-30 DIAGNOSIS — Z71.6 TOBACCO ABUSE COUNSELING: ICD-10-CM

## 2022-08-30 DIAGNOSIS — I10 ESSENTIAL HYPERTENSION: Primary | ICD-10-CM

## 2022-08-30 DIAGNOSIS — L73.9 FOLLICULITIS OF PERINEUM: ICD-10-CM

## 2022-08-30 DIAGNOSIS — Z91.89 AT RISK FOR SLEEP APNEA: ICD-10-CM

## 2022-08-30 PROCEDURE — 99214 OFFICE O/P EST MOD 30 MIN: CPT

## 2022-08-30 PROCEDURE — 99212 OFFICE O/P EST SF 10 MIN: CPT

## 2022-08-30 PROCEDURE — G8427 DOCREV CUR MEDS BY ELIG CLIN: HCPCS

## 2022-08-30 PROCEDURE — G8417 CALC BMI ABV UP PARAM F/U: HCPCS

## 2022-08-30 PROCEDURE — 1036F TOBACCO NON-USER: CPT

## 2022-08-30 RX ORDER — FLUTICASONE PROPIONATE 50 MCG
1 SPRAY, SUSPENSION (ML) NASAL DAILY
COMMUNITY

## 2022-08-30 RX ORDER — ESCITALOPRAM OXALATE 5 MG/1
5 TABLET ORAL DAILY
Qty: 90 TABLET | Refills: 1 | Status: SHIPPED
Start: 2022-08-30 | End: 2022-10-14 | Stop reason: SDUPTHER

## 2022-08-30 RX ORDER — MUPIROCIN CALCIUM 20 MG/G
CREAM TOPICAL
Qty: 15 G | Refills: 1 | Status: SHIPPED | OUTPATIENT
Start: 2022-08-30 | End: 2022-09-29

## 2022-08-30 RX ORDER — MONTELUKAST SODIUM 10 MG/1
10 TABLET ORAL NIGHTLY
COMMUNITY
End: 2022-09-02 | Stop reason: SINTOL

## 2022-08-30 SDOH — ECONOMIC STABILITY: HOUSING INSECURITY
IN THE LAST 12 MONTHS, WAS THERE A TIME WHEN YOU DID NOT HAVE A STEADY PLACE TO SLEEP OR SLEPT IN A SHELTER (INCLUDING NOW)?: NO

## 2022-08-30 SDOH — ECONOMIC STABILITY: INCOME INSECURITY: IN THE LAST 12 MONTHS, WAS THERE A TIME WHEN YOU WERE NOT ABLE TO PAY THE MORTGAGE OR RENT ON TIME?: NO

## 2022-08-30 SDOH — ECONOMIC STABILITY: FOOD INSECURITY: WITHIN THE PAST 12 MONTHS, YOU WORRIED THAT YOUR FOOD WOULD RUN OUT BEFORE YOU GOT MONEY TO BUY MORE.: SOMETIMES TRUE

## 2022-08-30 SDOH — ECONOMIC STABILITY: FOOD INSECURITY: WITHIN THE PAST 12 MONTHS, THE FOOD YOU BOUGHT JUST DIDN'T LAST AND YOU DIDN'T HAVE MONEY TO GET MORE.: SOMETIMES TRUE

## 2022-08-30 SDOH — ECONOMIC STABILITY: HOUSING INSECURITY: IN THE LAST 12 MONTHS, HOW MANY PLACES HAVE YOU LIVED?: 2

## 2022-08-30 ASSESSMENT — PATIENT HEALTH QUESTIONNAIRE - PHQ9
3. TROUBLE FALLING OR STAYING ASLEEP: 3
9. THOUGHTS THAT YOU WOULD BE BETTER OFF DEAD, OR OF HURTING YOURSELF: 3
7. TROUBLE CONCENTRATING ON THINGS, SUCH AS READING THE NEWSPAPER OR WATCHING TELEVISION: 0
6. FEELING BAD ABOUT YOURSELF - OR THAT YOU ARE A FAILURE OR HAVE LET YOURSELF OR YOUR FAMILY DOWN: 3
SUM OF ALL RESPONSES TO PHQ9 QUESTIONS 1 & 2: 6
4. FEELING TIRED OR HAVING LITTLE ENERGY: 3
SUM OF ALL RESPONSES TO PHQ QUESTIONS 1-9: 21
2. FEELING DOWN, DEPRESSED OR HOPELESS: 3
SUM OF ALL RESPONSES TO PHQ QUESTIONS 1-9: 21
5. POOR APPETITE OR OVEREATING: 3
SUM OF ALL RESPONSES TO PHQ QUESTIONS 1-9: 21
SUM OF ALL RESPONSES TO PHQ QUESTIONS 1-9: 18
8. MOVING OR SPEAKING SO SLOWLY THAT OTHER PEOPLE COULD HAVE NOTICED. OR THE OPPOSITE, BEING SO FIGETY OR RESTLESS THAT YOU HAVE BEEN MOVING AROUND A LOT MORE THAN USUAL: 0
10. IF YOU CHECKED OFF ANY PROBLEMS, HOW DIFFICULT HAVE THESE PROBLEMS MADE IT FOR YOU TO DO YOUR WORK, TAKE CARE OF THINGS AT HOME, OR GET ALONG WITH OTHER PEOPLE: 1
1. LITTLE INTEREST OR PLEASURE IN DOING THINGS: 3

## 2022-08-30 ASSESSMENT — COLUMBIA-SUICIDE SEVERITY RATING SCALE - C-SSRS
6. HAVE YOU EVER DONE ANYTHING, STARTED TO DO ANYTHING, OR PREPARED TO DO ANYTHING TO END YOUR LIFE?: NO
2. HAVE YOU ACTUALLY HAD ANY THOUGHTS OF KILLING YOURSELF?: YES
3. HAVE YOU BEEN THINKING ABOUT HOW YOU MIGHT KILL YOURSELF?: NO
5. HAVE YOU STARTED TO WORK OUT OR WORKED OUT THE DETAILS OF HOW TO KILL YOURSELF? DO YOU INTEND TO CARRY OUT THIS PLAN?: NO
1. WITHIN THE PAST MONTH, HAVE YOU WISHED YOU WERE DEAD OR WISHED YOU COULD GO TO SLEEP AND NOT WAKE UP?: YES
4. HAVE YOU HAD THESE THOUGHTS AND HAD SOME INTENTION OF ACTING ON THEM?: NO

## 2022-08-30 ASSESSMENT — ENCOUNTER SYMPTOMS
COUGH: 0
WHEEZING: 0
SHORTNESS OF BREATH: 1
EYES NEGATIVE: 1

## 2022-08-30 ASSESSMENT — SOCIAL DETERMINANTS OF HEALTH (SDOH): HOW HARD IS IT FOR YOU TO PAY FOR THE VERY BASICS LIKE FOOD, HOUSING, MEDICAL CARE, AND HEATING?: SOMEWHAT HARD

## 2022-08-30 NOTE — PROGRESS NOTES
[facility]  Internal Medicine Residency Program  [place] Note      SUBJECTIVE:  CC: had concerns including Follow-up and Hypertension. HPI:  Luis Alberto Ellis is a 25 y. o.female with PMH of essential hypertension, depression/anxiety, PTSD, seasonal allergies presenting to [place] for routine visit. She was last seen in the clinic last 4/21/22. During that time, she complained of pleuritic chest pain, thought to be costochondritis, and was sent home with trial of ibuprofen and lidocaine patch. This has resolved. Hypertension - diagnosed since her teenage years, has been on amlodipine since diagnosis. Was lost to follow-up before has not been taking it for a while. I restarted her back on it during our initial encounter. She has been compliant initially. She not been taking since early this year but BP has been controlled since last visit. Today, /83. Will continue to monitor off medications. Discussed lifestyle modification to further help in blood pressures. Depression/Anxiety/PTSD - previously seen a psychiatrist in the past but was lost to follow-up since her psychiatrist moved. On our initial encounter, she endorsed decreased appetite, feeling down, depressed and isolated since COVID quarantine began. PHQ-9 score was 16 at that time (moderately severe depression). She was referred to Ruben Artis and was started on Wellbutrin. Currently PHQ-9 score is at 21. States she has passive suicidal ideations but no plans in place. No homicidal ideations per patient. Also noted decreased appetite. Has family support at home but she is not really the type of person to open up feeling to family. Has been off Wellbutrin for a while as patient ran out. However, patient noted no difference while being on Wellbutrin.  Plan to start Lexapro 5mg daily and to reestablish with Jacoby Nieves for further options regarding therapy/referral to specialist. Patient also recently started on singulair which has black box warning on neuropsych symptoms. With patient's worsening depression, will discuss with Dr. Suzie Scott regarding this medication. Substance use - uses vape and marijuana daily. Noted this has been triggering her chest pain and SOB. Discussed smoking cessation, she is open to being referred to tobacco cessation clinic. Referral placed. Obesity - BMI 40.92 kg/m2. Discussed lifestyle modifications. Concerns for sleep apnea - STOP-BANG score: 4. Sleep study was ordered on previous visit, not done. Will reorder today. Seasonal allergies/upper airway cough - She follows with Dr. Suzie Scott (allergist). Recently started on flonase and singulair. Will need to discuss singulair and monitor depression symptoms. Folliculitis on perineal area - Patient noticed painful lesions on her perineal area, with drainage previously. Currently, no active lesion noted. This has occurred multiple times. She usually has that area waxed. She was referred to dermatology previously but her insurance did not cover. Will send a new referral today and will start topical mupirocin for now. Health maintenance. Recently had pap smear results revealing atypical squamous cells of undetermined significance with + high risk HPV. Underwent colposcopy last month. Advised for repeat pap and HPV testing in 1 year, as well as getting HPV vaccine. She did complete 3-dose series of HPV vaccine. Last dose in 2017. To continue annual pap  Flu vaccine due, patient to get during flu season. COVID vaccine - discussed risks and benefits. Not interested at this time but patient will further read on it. Will discuss on next visit. Review of Systems   Constitutional:  Negative for fever. HENT: Negative. Eyes: Negative. Respiratory:  Positive for shortness of breath. Negative for cough and wheezing. Cardiovascular:  Positive for chest pain. Negative for palpitations. Psychiatric/Behavioral:  Positive for sleep disturbance and suicidal ideas.  Negative for decreased concentration and self-injury. The patient is nervous/anxious. Outpatient Medications Marked as Taking for the 8/30/22 encounter (Office Visit) with Fabricio Riojas MD   Medication Sig Dispense Refill    montelukast (SINGULAIR) 10 MG tablet Take 10 mg by mouth nightly Dr nuñez      fluticasone (FLONASE) 50 MCG/ACT nasal spray 1 spray by Each Nostril route daily Dr nuñez      escitalopram (LEXAPRO) 5 MG tablet Take 1 tablet by mouth daily 90 tablet 1    mupirocin (BACTROBAN) 2 % cream Apply 3 times daily. 15 g 1       OBJECTIVE:    VS:   /83 (Site: Right Upper Arm, Position: Sitting, Cuff Size: Large Adult)   Pulse 98   Temp 97.1 °F (36.2 °C) (Temporal)   Resp 16   Ht 5' 3\" (1.6 m)   Wt 236 lb (107 kg)   LMP 08/30/2022 (Exact Date)   SpO2 99% Comment: room air  BMI 41.81 kg/m²     EXAM:  Physical Exam  Constitutional:       Appearance: Normal appearance. She is obese. HENT:      Head: Normocephalic and atraumatic. Right Ear: External ear normal.      Left Ear: External ear normal.      Nose: Nose normal.      Mouth/Throat:      Mouth: Mucous membranes are moist.      Pharynx: Oropharynx is clear. Eyes:      Extraocular Movements: Extraocular movements intact. Conjunctiva/sclera: Conjunctivae normal.      Pupils: Pupils are equal, round, and reactive to light. Cardiovascular:      Rate and Rhythm: Normal rate and regular rhythm. Pulses: Normal pulses. Heart sounds: Normal heart sounds. Pulmonary:      Effort: Pulmonary effort is normal.      Breath sounds: Normal breath sounds. Abdominal:      General: Bowel sounds are normal.      Palpations: Abdomen is soft. Genitourinary:     Exam position: Supine. Pubic Area: No rash. Comments: Healing wound noted on perineal area. Musculoskeletal:         General: Normal range of motion. Cervical back: Normal range of motion and neck supple. Skin:     General: Skin is warm.       Capillary Refill: Capillary refill takes less than 2 seconds. Neurological:      General: No focal deficit present. Mental Status: She is alert and oriented to person, place, and time. Mental status is at baseline. Psychiatric:         Attention and Perception: Attention and perception normal.         Mood and Affect: Mood is depressed. Affect is blunt. Behavior: Behavior normal. Behavior is cooperative. Thought Content: Thought content includes suicidal ideation. Thought content does not include homicidal ideation. Thought content does not include homicidal or suicidal plan. Cognition and Memory: Cognition and memory normal.         Judgment: Judgment normal.       ASSESSMENT/PLAN:  I have reviewed all pertinent PMH, PSH, FH, SH, medications and allergies and updated history as appropriate. Jaleesa Yusuf was seen today for follow-up and hypertension. Diagnoses and all orders for this visit:    Essential hypertension  - Discontinue amlodipine  - Lifestyle modification    Depression, unspecified depression type  -     escitalopram (LEXAPRO) 5 MG tablet; Take 1 tablet by mouth daily    Folliculitis of perineum  -     Yamile Wolff DO, Dermatology, [facility] ([facility])  -     mupirocin (BACTROBAN) 2 % cream; Apply 3 times daily. Tobacco abuse counseling  -     Internal Referral To Smoking Cessation Program ([id number])    At risk for sleep apnea  -     Baseline Diagnostic Sleep Study;  Future        RTC:  To return in 3 months    I have reviewed my findings and recommendations with Ezra Cooley and Dr Pollo Jack MD PGY-2  8/30/2022 6:02 PM

## 2022-08-30 NOTE — PROGRESS NOTES
[facility]  Internal Medicine Residency Clinic    Attending Physician Statement  I have discussed the case, including pertinent history and exam findings with the resident physician. I have seen and examined the patient and the key elements of the encounter have been performed by me. I agree with the assessment, plan and orders as documented by the resident. I have reviewed all pertinent PMHx, PSHx, FamHx, SocialHx, medications, and allergies and updated history as appropriate. Patient presents for routine follow up of medical problems. Depression, severe with passive suicidal ideations without plan with hx PTSD / trauma -- no improvement in symptoms with Wellbutrin, start Lexapro. PRIYA met with patient, patient appears to have appropriate insight and no active concerns    Upper airway cough - recently started Flonase /  Singulair per allergist -- will need monitoring for mood changes/worsening given black box warning on Singulair and Dr. Glenys Rankin will discuss with that office     Tobacco use disorder -- interested in referral for cessation class    At risk for AYUSH -- previously referred for sleep study -- needs to be scheduled    ASCUS with + HPV -- continue annual PAP; she did complete 3 dose series HPV vaccination    Labial folliculitis -- topical Bactroban, no active lesions; derm referral as previous referral did not take her insurance    Obesity class 3 (BMI 41) -- counseled lifestyle modifications     Remainder of medical problems as per resident note.     Leticia Latham DO  8/30/2022 4:26 PM

## 2022-08-30 NOTE — PATIENT INSTRUCTIONS
Thank you for coming to your follow up appointment   Please take your medications as directed and keep your follow up appointment in 3 months. Call our office if you have any questions or concerns at [phone]  Have blood work done prior to next appointment.     Max León MD

## 2022-09-01 ENCOUNTER — TELEPHONE (OUTPATIENT)
Dept: INTERNAL MEDICINE | Age: 22
End: 2022-09-01

## 2022-09-01 NOTE — TELEPHONE ENCOUNTER
Called patient today to inform her about my discussion with Dr. Quinton Dance regarding concerns for worsening depression and suicidal thoughts as she was recently started on Singulair. Dr. Quinton Dance recommended to stop Singulair immediately and to call his office to schedule for a follow-up ASAP. Patient verbalized understanding.     Electronically signed by Ellie Prado MD on 9/1/2022 at 3:39 PM

## 2022-09-02 NOTE — TELEPHONE ENCOUNTER
Agree with plan per Dr. Alyx Guevara. Discontinue Singulair.     Electronically signed by Nithin Reynoso DO on 9/2/2022 at 8:58 AM

## 2022-09-06 ENCOUNTER — HOSPITAL ENCOUNTER (OUTPATIENT)
Dept: PSYCHIATRY | Age: 22
Discharge: HOME | End: 2022-09-06

## 2022-09-19 ENCOUNTER — HOSPITAL ENCOUNTER (OUTPATIENT)
Dept: PSYCHIATRY | Age: 22
Discharge: HOME | End: 2022-09-19

## 2022-09-19 NOTE — FLOWSHEET NOTE
Client did not show for intake. CTTS attempted to contact client. CTTS left voicemail for client to call back.          Electronically signed by MIGUEL Up on 9/19/2022 at 5:17 PM

## 2022-10-03 ENCOUNTER — HOSPITAL ENCOUNTER (OUTPATIENT)
Dept: PSYCHIATRY | Age: 22
Discharge: HOME | End: 2022-10-03

## 2022-10-04 NOTE — FLOWSHEET NOTE
Called client multiple times. Phone goes straight to voicemail. No show for intake.     Electronically signed by MIGUEL Arizmendi on 10/4/2022 at 11:48 AM

## 2022-10-13 ENCOUNTER — TELEPHONE (OUTPATIENT)
Dept: INTERNAL MEDICINE | Age: 22
End: 2022-10-13

## 2022-10-13 DIAGNOSIS — F32.A DEPRESSION, UNSPECIFIED DEPRESSION TYPE: ICD-10-CM

## 2022-10-13 NOTE — TELEPHONE ENCOUNTER
Spoke to patient, she was taking medication not as prescribed. She was taking 3 tabs once daily. Please advise on refills. Next appt is 11/04.

## 2022-10-13 NOTE — TELEPHONE ENCOUNTER
----- Message from Omer Aguilars sent at 10/13/2022  1:57 PM EDT -----  Subject: Refill Request    QUESTIONS  Name of Medication? escitalopram (LEXAPRO) 5 MG tablet  Patient-reported dosage and instructions? 3x daily  How many days do you have left? 1  Preferred Pharmacy? [facility] #[id number]  Pharmacy phone number (if available)? [phone]  ---------------------------------------------------------------------------  --------------  CALL BACK INFO  What is the best way for the office to contact you? OK to leave message on   voicemail  Preferred Call Back Phone Number? [phone]  ---------------------------------------------------------------------------  --------------  SCRIPT ANSWERS  Relationship to Patient?  Self

## 2022-10-14 RX ORDER — ESCITALOPRAM OXALATE 5 MG/1
5 TABLET ORAL DAILY
Qty: 30 TABLET | Refills: 0 | Status: SHIPPED
Start: 2022-10-14 | End: 2022-10-14 | Stop reason: ALTCHOICE

## 2022-10-17 ENCOUNTER — TELEPHONE (OUTPATIENT)
Dept: INTERNAL MEDICINE | Age: 22
End: 2022-10-17

## 2022-11-04 ENCOUNTER — HOSPITAL ENCOUNTER (OUTPATIENT)
Dept: SLEEP CENTER | Age: 22
Discharge: HOME | End: 2022-11-04
Payer: COMMERCIAL

## 2022-11-04 DIAGNOSIS — Z91.89 AT RISK FOR SLEEP APNEA: ICD-10-CM

## 2022-11-04 PROCEDURE — 95810 POLYSOM 6/> YRS 4/> PARAM: CPT | Performed by: INTERNAL MEDICINE

## 2022-11-04 PROCEDURE — 95810 POLYSOM 6/> YRS 4/> PARAM: CPT

## 2022-11-05 VITALS
HEART RATE: 89 BPM | OXYGEN SATURATION: 99 % | WEIGHT: 226 LBS | BODY MASS INDEX: 40.04 KG/M2 | HEIGHT: 63 IN | DIASTOLIC BLOOD PRESSURE: 85 MMHG | SYSTOLIC BLOOD PRESSURE: 140 MMHG

## 2022-11-05 ASSESSMENT — SLEEP AND FATIGUE QUESTIONNAIRES
HOW LIKELY ARE YOU TO NOD OFF OR FALL ASLEEP WHILE SITTING AND READING: 2
HOW LIKELY ARE YOU TO NOD OFF OR FALL ASLEEP WHILE WATCHING TV: 3
HOW LIKELY ARE YOU TO NOD OFF OR FALL ASLEEP WHILE SITTING AND TALKING TO SOMEONE: 0
HOW LIKELY ARE YOU TO NOD OFF OR FALL ASLEEP IN A CAR, WHILE STOPPED FOR A FEW MINUTES IN TRAFFIC: 0
HOW LIKELY ARE YOU TO NOD OFF OR FALL ASLEEP WHILE LYING DOWN TO REST IN THE AFTERNOON WHEN CIRCUMSTANCES PERMIT: 2
ESS TOTAL SCORE: 10
HOW LIKELY ARE YOU TO NOD OFF OR FALL ASLEEP WHEN YOU ARE A PASSENGER IN A CAR FOR AN HOUR WITHOUT A BREAK: 3
HOW LIKELY ARE YOU TO NOD OFF OR FALL ASLEEP WHILE SITTING INACTIVE IN A PUBLIC PLACE: 0
HOW LIKELY ARE YOU TO NOD OFF OR FALL ASLEEP WHILE SITTING QUIETLY AFTER LUNCH WITHOUT ALCOHOL: 0

## 2022-11-07 ENCOUNTER — OFFICE VISIT (OUTPATIENT)
Dept: INTERNAL MEDICINE | Age: 22
End: 2022-11-07
Payer: COMMERCIAL

## 2022-11-07 VITALS
TEMPERATURE: 98.2 F | OXYGEN SATURATION: 99 % | RESPIRATION RATE: 18 BRPM | DIASTOLIC BLOOD PRESSURE: 89 MMHG | HEIGHT: 63 IN | BODY MASS INDEX: 41.46 KG/M2 | WEIGHT: 234 LBS | HEART RATE: 97 BPM | SYSTOLIC BLOOD PRESSURE: 131 MMHG

## 2022-11-07 DIAGNOSIS — L73.9 FOLLICULITIS OF PERINEUM: ICD-10-CM

## 2022-11-07 DIAGNOSIS — Z23 INFLUENZA VACCINE ADMINISTERED: ICD-10-CM

## 2022-11-07 DIAGNOSIS — F32.A DEPRESSION, UNSPECIFIED DEPRESSION TYPE: Primary | ICD-10-CM

## 2022-11-07 PROCEDURE — 3074F SYST BP LT 130 MM HG: CPT

## 2022-11-07 PROCEDURE — G8427 DOCREV CUR MEDS BY ELIG CLIN: HCPCS

## 2022-11-07 PROCEDURE — G8482 FLU IMMUNIZE ORDER/ADMIN: HCPCS

## 2022-11-07 PROCEDURE — 3078F DIAST BP <80 MM HG: CPT

## 2022-11-07 PROCEDURE — G8417 CALC BMI ABV UP PARAM F/U: HCPCS

## 2022-11-07 PROCEDURE — 99213 OFFICE O/P EST LOW 20 MIN: CPT

## 2022-11-07 PROCEDURE — 1036F TOBACCO NON-USER: CPT

## 2022-11-07 RX ORDER — MONTELUKAST SODIUM 10 MG/1
TABLET ORAL
COMMUNITY
Start: 2022-11-01 | End: 2022-11-07 | Stop reason: SINTOL

## 2022-11-07 RX ORDER — ESCITALOPRAM OXALATE 10 MG/1
10 TABLET ORAL NIGHTLY
Qty: 30 TABLET | Refills: 2 | Status: SHIPPED | OUTPATIENT
Start: 2022-11-07

## 2022-11-07 ASSESSMENT — ENCOUNTER SYMPTOMS
NAUSEA: 0
CHEST TIGHTNESS: 0
ABDOMINAL PAIN: 0
VOMITING: 0
DIARRHEA: 0
COUGH: 0
SHORTNESS OF BREATH: 0
ALLERGIC/IMMUNOLOGIC NEGATIVE: 1

## 2022-11-07 ASSESSMENT — PATIENT HEALTH QUESTIONNAIRE - PHQ9
3. TROUBLE FALLING OR STAYING ASLEEP: 3
SUM OF ALL RESPONSES TO PHQ QUESTIONS 1-9: 16
7. TROUBLE CONCENTRATING ON THINGS, SUCH AS READING THE NEWSPAPER OR WATCHING TELEVISION: 0
SUM OF ALL RESPONSES TO PHQ QUESTIONS 1-9: 16
SUM OF ALL RESPONSES TO PHQ QUESTIONS 1-9: 16
1. LITTLE INTEREST OR PLEASURE IN DOING THINGS: 2
6. FEELING BAD ABOUT YOURSELF - OR THAT YOU ARE A FAILURE OR HAVE LET YOURSELF OR YOUR FAMILY DOWN: 3
SUM OF ALL RESPONSES TO PHQ QUESTIONS 1-9: 16
9. THOUGHTS THAT YOU WOULD BE BETTER OFF DEAD, OR OF HURTING YOURSELF: 0
SUM OF ALL RESPONSES TO PHQ9 QUESTIONS 1 & 2: 4
4. FEELING TIRED OR HAVING LITTLE ENERGY: 3
10. IF YOU CHECKED OFF ANY PROBLEMS, HOW DIFFICULT HAVE THESE PROBLEMS MADE IT FOR YOU TO DO YOUR WORK, TAKE CARE OF THINGS AT HOME, OR GET ALONG WITH OTHER PEOPLE: 3
5. POOR APPETITE OR OVEREATING: 3
2. FEELING DOWN, DEPRESSED OR HOPELESS: 2
8. MOVING OR SPEAKING SO SLOWLY THAT OTHER PEOPLE COULD HAVE NOTICED. OR THE OPPOSITE, BEING SO FIGETY OR RESTLESS THAT YOU HAVE BEEN MOVING AROUND A LOT MORE THAN USUAL: 0

## 2022-11-07 NOTE — PROGRESS NOTES
[facility]  [facility]    Attending Physician Statement  I have discussed the case, including pertinent history and exam findings with the resident physician. I agree with the assessment, plan and orders as documented by the resident. I have reviewed all pertinent PMHx, PSHx, FamHx, SocialHx, medications, and allergies and updated history as appropriate. Patient presents for routine follow up of medical problems. Depression - was taken off of Singulair. Was started on lexapro, 5 mg daily. Her PHQ-9 score was 21 and was referred to [facility]. She did not follow-up with Connye Buerger and states that she is not ready for follow-up. She is taking it twice daily, but not consistently. Off for 1 month. Right now, she denies any SI. For the past few weeks, she reports some improvement. Today, her PHQ score is 19. Wants to restart lexapro. Restart lexapro, 10 mg daily      HTN - controlled today. Continue to monitor. Interested in smoking cessation. Awaiting results of sleep study. Follows with allergist (Dr. Debbie Hess). Remainder of medical problems as per resident note.     Estela Landry MD  11/7/2022 2:36 PM

## 2022-11-07 NOTE — PATIENT INSTRUCTIONS
Thank you for coming to your follow up appointment   Please take your medications as directed and keep your follow up appointment in 2 months. Please take escitalopram (Lexapro) 10mg nightly. Please take Dicloxacillin 500mg four times daily for 7 days.     Call the tobacco cessation clinic to have yourself scheduled with them  Call our office if you have any questions or concerns at [phone]    Jayne Durand MD

## 2022-11-07 NOTE — PROGRESS NOTES
[facility]  Internal Medicine Residency Program  [facility] Note      SUBJECTIVE:  CC: had concerns including Follow-up and Health Maintenance (Declines Covid vaccine at present). HPI:  Torey Prado is a 25 y. o.female with PMH of essential hypertension, depression/anxiety, PTSD, seasonal allergies presenting to [facility] for routine visit. She was last seen in the clinic last 8/30/22. Since last visit, patient has been doing fine. No major changes in symptoms. Hypertension - diagnosed since her teenage years, has been on amlodipine since diagnosis. Was lost to follow-up before has not been taking it for a while. I restarted her back on it during our initial encounter. She has been compliant initially. She not been taking since early this year but BP has been controlled since last visit. Discontinued on previous visit. BP continues to be controlled without medications. Will continue to monitor off meds. Depression/Anxiety/PTSD - previously seen a psychiatrist in the past but was lost to follow-up since her psychiatrist moved. On our initial encounter, she endorsed decreased appetite, feeling down, depressed and isolated since COVID quarantine began. PHQ-9 score was 16 at that time (moderately severe depression). She was referred to [facility] and was started on Wellbutrin. On previous visit, PHQ-9 score was at 21. During that time, she has passive suicidal ideations but no plans in place. No homicidal ideations per patient. Also noted decreased appetite. Has family support at home but she is not really the type of person to open up feeling to family. Has been off Wellbutrin for a while as patient ran out. However, patient noted no difference while being on Wellbutrin. We started her on Lexapro 5mg daily but took it twice daily and ran out of the medication for more than a month. Singulair was also discontinued at that time. Anxiety symptoms increased recently, easily triggered by anything. She was referred to [facility] on previous visit but at this time, she declines counseling. Advised that if ever she thinks she needs and is ready for counseling to just inform us and we'll reestablish her with [facility]. PHQ-9 score today is down to 16. No SI the last couple of weeks. Has been trying to meet up with friends at least once a week. Plan to restart her on lexapro this time at 10mg daily to which patient is agreeable. Substance use - uses vape and marijuana daily. Noted this has been triggering her chest pain and SOB. Discussed smoking cessation, she is open to being referred to tobacco cessation clinic. Referral placed. Scheduling issues. Advised to call them back  Obesity - BMI 40.92 kg/m2. Discussed lifestyle modifications. Concerns for sleep apnea - STOP-BANG score: 4. Sleep study done on 11/4/22. Results pending  Seasonal allergies/upper airway cough - She follows with Dr. Karol Garibay (allergist). On flonase and a medication that she does not remember the name. Singulair discontinued on previous visit due to concerns with suicidal ideations on previous visit (neuropsychiatric black box warning)  Folliculitis on perineal area - Patient noticed painful lesions on her perineal area, with drainage previously. New active lesion on a different site. Was on mupirocin last time. This has been recurrent. Will start oral antibiotic at this time. She has a follow-up with dermatology next month. Health maintenance. Recently had pap smear results revealing atypical squamous cells of undetermined significance with + high risk HPV. Underwent colposcopy last month. Advised for repeat pap and HPV testing in 1 year, as well as getting HPV vaccine. She did complete 3-dose series of HPV vaccine. Last dose in 2017. To continue annual pap  Flu vaccine today      Review of Systems   Constitutional:  Positive for fatigue. Negative for appetite change, fever and unexpected weight change. HENT: Negative.      Respiratory:  Negative for cough, chest tightness and shortness of breath. Cardiovascular:  Positive for chest pain (when anxious). Negative for palpitations. Gastrointestinal:  Negative for abdominal pain, diarrhea, nausea and vomiting. Endocrine: Negative. Genitourinary: Negative. Musculoskeletal: Negative. Skin: Negative. Allergic/Immunologic: Negative. Neurological: Negative. Hematological: Negative. Psychiatric/Behavioral:  Positive for sleep disturbance and suicidal ideas. Negative for decreased concentration, hallucinations and self-injury. Outpatient Medications Marked as Taking for the 11/7/22 encounter (Office Visit) with Cora Mckeon MD   Medication Sig Dispense Refill    escitalopram (LEXAPRO) 10 MG tablet Take 1 tablet by mouth nightly 30 tablet 2    dicloxacillin (DYNAPEN) 500 MG capsule Take 1 capsule by mouth 4 times daily for 7 days 28 capsule 0    fluticasone (FLONASE) 50 MCG/ACT nasal spray 1 spray by Each Nostril route daily Dr nuñez         OBJECTIVE:    VS:   /89 (Site: Left Upper Arm, Position: Sitting, Cuff Size: Medium Adult)   Pulse 97   Temp 98.2 °F (36.8 °C) (Temporal)   Resp 18   Ht 5' 3\" (1.6 m)   Wt 234 lb (106.1 kg)   LMP 10/26/2022 (Approximate)   SpO2 99% Comment: room air  BMI 41.45 kg/m²     EXAM:  Physical Exam  Constitutional:       Appearance: Normal appearance. She is obese. HENT:      Head: Normocephalic and atraumatic. Right Ear: External ear normal.      Left Ear: External ear normal.      Nose: Nose normal.      Mouth/Throat:      Mouth: Mucous membranes are moist.      Pharynx: Oropharynx is clear. Eyes:      Extraocular Movements: Extraocular movements intact. Conjunctiva/sclera: Conjunctivae normal.      Pupils: Pupils are equal, round, and reactive to light. Cardiovascular:      Rate and Rhythm: Normal rate and regular rhythm. Pulses: Normal pulses. Heart sounds: Normal heart sounds.    Pulmonary: Effort: Pulmonary effort is normal.      Breath sounds: Normal breath sounds. Abdominal:      General: Bowel sounds are normal.      Palpations: Abdomen is soft. Musculoskeletal:         General: Normal range of motion. Cervical back: Normal range of motion and neck supple. Skin:     General: Skin is warm. Capillary Refill: Capillary refill takes less than 2 seconds. Neurological:      General: No focal deficit present. Mental Status: She is alert. Mental status is at baseline. She is disoriented. Psychiatric:         Attention and Perception: Attention and perception normal. She does not perceive auditory or visual hallucinations. Mood and Affect: Mood is depressed. Speech: Speech normal.         Behavior: Behavior normal. Behavior is cooperative. Thought Content: Thought content normal. Thought content does not include suicidal ideation. Thought content does not include suicidal plan. Judgment: Judgment normal.       ASSESSMENT/PLAN:  I have reviewed all pertinent PMH, PSH, FH, SH, medications and allergies and updated history as appropriate. Adrien Guo was seen today for follow-up and health maintenance. Diagnoses and all orders for this visit:    Depression, unspecified depression type  -     escitalopram (LEXAPRO) 10 MG tablet; Take 1 tablet by mouth nightly    Influenza vaccine administered  -     Influenza, AFLURIA QUADV, (age 3 yrs+), IM, PF, 5.1LD    Folliculitis of perineum  -     dicloxacillin (DYNAPEN) 500 MG capsule;  Take 1 capsule by mouth 4 times daily for 7 days    BMI (body mass index), pediatric, greater than 99% for age        RTC:  To return in 2 months    I have reviewed my findings and recommendations with Gerber Fitzpatrick and Dr Canelo Juarez MD PGY-2  11/7/2022 9:01 PM

## 2022-11-11 NOTE — PROGRESS NOTES
[address] [address]                               SLEEP STUDY REPORT    PATIENT NAME: Cindy Merida                      :        2000  MED REC NO:   [id number]                            ROOM:  ACCOUNT NO:   [de-identified]                           ADMIT DATE: 2022  PROVIDER:     Muriel Silverman DO    DATE OF PROCEDURE:  2022    IDENTIFYING DATA:  This is a polysomnography in a 71-year-old female, 63  inches, 226 pounds, with a BMI of 40. The patient has a past medical  history of obesity, hypertension, depression, post-traumatic stress  disorder, and substance abuse. The patient vapes and uses marijuana  frequently. FINDINGS:  After appropriate diagnostic setup, polysomnography reveals a  total recording time of 399 minutes with sleep period time of 303  minutes with total sleep time of 285 minutes, with sleep efficiency of  71%, with a sleep latency of 96.7 minutes. There were two REM periods  achieved with a normal REM latency of 97 minutes. SLEEP STAGES:  4.4% N1, 61.8% N2, 23.9% N3, and 10% REM. RESPIRATORY EVENTS:  There were no apneas and one hypopnea giving a  normal apnea-hypopnea index of 0.2. LEG MOVEMENT SUMMARY:  There are two isolated leg movements with no  periodic limb movements identified. OXYGENATION:  Highest oxygen saturation 100%, average oxygen saturation  98%, lowest saturation 91% with no nocturnal hypoxemia. ELECTROCARDIOGRAM:  Awake heart rate is 100 beats per minute. Steady  average sleep heart rate is 75 beats per minute, lowest heart rate is 53  beats per minute. TECHNICAL COMMENTS:  All leads were placed. The patient arrived on  time. Procedure was reviewed. The patient understands and tolerated  hook up well. The patient's snoring was rated 2 on an amplitude of 10  scale. Rare hypopneas noted.   Overall cardiac rhythm was sinus, lowest  heart rate was 47 beats per minute. IMPRESSION:  1. Class III obesity. 2.  Hypersomnolence. 3.  Reduced sleep efficiency. 4.  Mildly fragmented sleep. 5.  No central, mixed or obstructive sleep apnea. 6.  No nocturnal hypoxemia. 7.  No leg movement disorder. 8.  No periodic limb movements. 9.  No malignant nocturnal cardiac dysrhythmias. 10.  No nocturnal hypoxemia. RECOMMENDATIONS:  Recommend weight loss and sleep counseling as the  above parameters indicate poor sleep performance, but no evidence to  suggest the need to treat sleep apnea syndrome. Clinical correlation  needed.         Krissy Yost DO    D: 11/10/2022 16:12:34       T: 11/10/2022 16:14:49     [email or web address]  Job#: [id number]     Doc#: [id number]    CC:

## 2022-11-16 ENCOUNTER — TELEPHONE (OUTPATIENT)
Dept: INTERNAL MEDICINE | Age: 22
End: 2022-11-16

## 2022-11-17 NOTE — PROGRESS NOTES
[address] [address]                               SLEEP STUDY REPORT    PATIENT NAME: María Elena Jiang                      :        2000  MED REC NO:   [id number]                            ROOM:  ACCOUNT NO:   [de-identified]                           ADMIT DATE: 2022  PROVIDER:     Brittany Contreras MD    DATE OF STUDY:  2022    POLYSOMNOGRAM OVER-READ    COMMENT:  I have reviewed the polysomnogram including Dr. Trenton Bartholomew  interpretation on this patient. I completely agree with the  interpretation and no changes are necessary.         Andres Cantu MD  Diplomat of Sleep Medicine    D: 2022 14:03:08       T: 2022 14:05:26     [email or web address]  Job#: [id number]     Doc#: [id number]    CC:

## 2022-12-07 DIAGNOSIS — F32.A DEPRESSION, UNSPECIFIED DEPRESSION TYPE: ICD-10-CM

## 2022-12-07 RX ORDER — ESCITALOPRAM OXALATE 10 MG/1
10 TABLET ORAL NIGHTLY
Qty: 30 TABLET | Refills: 2 | OUTPATIENT
Start: 2022-12-07

## 2023-01-16 ENCOUNTER — OFFICE VISIT (OUTPATIENT)
Dept: INTERNAL MEDICINE | Age: 23
End: 2023-01-16
Payer: COMMERCIAL

## 2023-01-16 VITALS
DIASTOLIC BLOOD PRESSURE: 85 MMHG | SYSTOLIC BLOOD PRESSURE: 133 MMHG | WEIGHT: 247 LBS | RESPIRATION RATE: 18 BRPM | BODY MASS INDEX: 43.77 KG/M2 | TEMPERATURE: 97.6 F | OXYGEN SATURATION: 99 % | HEART RATE: 88 BPM | HEIGHT: 63 IN

## 2023-01-16 DIAGNOSIS — F32.A DEPRESSION, UNSPECIFIED DEPRESSION TYPE: Primary | ICD-10-CM

## 2023-01-16 DIAGNOSIS — Z71.6 TOBACCO ABUSE COUNSELING: ICD-10-CM

## 2023-01-16 PROCEDURE — G8482 FLU IMMUNIZE ORDER/ADMIN: HCPCS

## 2023-01-16 PROCEDURE — 3078F DIAST BP <80 MM HG: CPT

## 2023-01-16 PROCEDURE — 99212 OFFICE O/P EST SF 10 MIN: CPT

## 2023-01-16 PROCEDURE — 3074F SYST BP LT 130 MM HG: CPT

## 2023-01-16 PROCEDURE — G8417 CALC BMI ABV UP PARAM F/U: HCPCS

## 2023-01-16 PROCEDURE — 99213 OFFICE O/P EST LOW 20 MIN: CPT

## 2023-01-16 PROCEDURE — 1036F TOBACCO NON-USER: CPT

## 2023-01-16 PROCEDURE — G8427 DOCREV CUR MEDS BY ELIG CLIN: HCPCS

## 2023-01-16 RX ORDER — LANOLIN ALCOHOL/MO/W.PET/CERES
3 CREAM (GRAM) TOPICAL NIGHTLY
Qty: 90 TABLET | Refills: 1 | Status: SHIPPED | OUTPATIENT
Start: 2023-01-16

## 2023-01-16 RX ORDER — DOXYCYCLINE HYCLATE 100 MG/1
CAPSULE ORAL
COMMUNITY
Start: 2022-12-13

## 2023-01-16 RX ORDER — MONTELUKAST SODIUM 10 MG/1
TABLET ORAL
COMMUNITY
Start: 2023-01-05

## 2023-01-16 RX ORDER — VENLAFAXINE HYDROCHLORIDE 37.5 MG/1
37.5 CAPSULE, EXTENDED RELEASE ORAL DAILY
Qty: 30 CAPSULE | Refills: 3 | Status: SHIPPED | OUTPATIENT
Start: 2023-01-16

## 2023-01-16 RX ORDER — BENZOYL PEROXIDE 100 MG/ML
LIQUID TOPICAL
COMMUNITY
Start: 2022-12-15

## 2023-01-16 RX ORDER — CLINDAMYCIN PHOSPHATE 10 UG/ML
LOTION TOPICAL
COMMUNITY
Start: 2022-12-13

## 2023-01-16 RX ORDER — NICOTINE 21 MG/24HR
1 PATCH, TRANSDERMAL 24 HOURS TRANSDERMAL DAILY
Qty: 42 PATCH | Refills: 0 | Status: SHIPPED | OUTPATIENT
Start: 2023-01-16 | End: 2023-02-27

## 2023-01-16 RX ORDER — ESCITALOPRAM OXALATE 5 MG/1
5 TABLET ORAL NIGHTLY
Qty: 15 TABLET | Refills: 0 | Status: SHIPPED | OUTPATIENT
Start: 2023-01-16

## 2023-01-16 ASSESSMENT — PATIENT HEALTH QUESTIONNAIRE - PHQ9
4. FEELING TIRED OR HAVING LITTLE ENERGY: 1
SUM OF ALL RESPONSES TO PHQ QUESTIONS 1-9: 9
2. FEELING DOWN, DEPRESSED OR HOPELESS: 1
5. POOR APPETITE OR OVEREATING: 3
SUM OF ALL RESPONSES TO PHQ QUESTIONS 1-9: 9
SUM OF ALL RESPONSES TO PHQ QUESTIONS 1-9: 9
7. TROUBLE CONCENTRATING ON THINGS, SUCH AS READING THE NEWSPAPER OR WATCHING TELEVISION: 0
9. THOUGHTS THAT YOU WOULD BE BETTER OFF DEAD, OR OF HURTING YOURSELF: 0
10. IF YOU CHECKED OFF ANY PROBLEMS, HOW DIFFICULT HAVE THESE PROBLEMS MADE IT FOR YOU TO DO YOUR WORK, TAKE CARE OF THINGS AT HOME, OR GET ALONG WITH OTHER PEOPLE: 1
6. FEELING BAD ABOUT YOURSELF - OR THAT YOU ARE A FAILURE OR HAVE LET YOURSELF OR YOUR FAMILY DOWN: 0
8. MOVING OR SPEAKING SO SLOWLY THAT OTHER PEOPLE COULD HAVE NOTICED. OR THE OPPOSITE, BEING SO FIGETY OR RESTLESS THAT YOU HAVE BEEN MOVING AROUND A LOT MORE THAN USUAL: 0
1. LITTLE INTEREST OR PLEASURE IN DOING THINGS: 1
3. TROUBLE FALLING OR STAYING ASLEEP: 3
SUM OF ALL RESPONSES TO PHQ QUESTIONS 1-9: 9
SUM OF ALL RESPONSES TO PHQ9 QUESTIONS 1 & 2: 2

## 2023-01-16 ASSESSMENT — ENCOUNTER SYMPTOMS
SHORTNESS OF BREATH: 0
VOMITING: 0
DIARRHEA: 0
CHEST TIGHTNESS: 0
VOICE CHANGE: 0
SORE THROAT: 0
COUGH: 0
CONSTIPATION: 0
BLOOD IN STOOL: 0
BACK PAIN: 0
ABDOMINAL PAIN: 0

## 2023-01-16 NOTE — PROGRESS NOTES
[facility]  Internal Medicine Residency Clinic    Attending Physician Statement  I have discussed the case, including pertinent history and exam findings with the resident physician. I agree with the assessment, plan and orders as documented by the resident. I have reviewed all pertinent PMHx, PSHx, FamHx, SocialHx, medications, and allergies and updated history as appropriate. Patient presents for routine follow up of medical problems. 2 month follow-up for depression. PHQ-9 was 21 initially in August.  Started on escitalopram. Last visit, the PHQ-9 was 16. PHQ-9 is 9 today. Denying any SI/HI at this time. Reports good and bad days. Appetite is still poor. Adherent to medication, but she does not think this is helping despite improvement in PHQ-9. Patient on escitalopram for >8 weeks without subjective response. I would advocate a therapeutic switch for this patient. Agree with trial of venlafaxine. Will decrease escitalopram dosing to 5 mg daily. Start venlafaxine at 37.5 mg daily. Patient to return in 2 weeks at which time, escitalopram will be discontinued completely. Remainder of medical problems as per resident note.     Lisa Jackman MD  1/16/2023 4:14 PM

## 2023-01-16 NOTE — PROGRESS NOTES
[facility] [address]  Internal Medicine Residency Program  [facility] Note      SUBJECTIVE:  CC: had concerns including Follow-up and Health Maintenance (Declines Covid vaccine). HPI:  Amisha Gan is a 25 y. o.female with PMH of essential hypertension, depression/anxiety, PTSD, seasonal allergies  presenting to [facility] for a 2 month follow-up. She was last seen on 11/7/22 for a routine visit. Since last visit, she has been doing ok. No active complaints at this time. Depression/Anxiety/PTSD - previously seen a psychiatrist in the past but was lost to follow-up since her psychiatrist moved. On our initial encounter, she endorsed decreased appetite, feeling down, depressed and isolated since COVID quarantine began. PHQ-9 score was 16 at that time (moderately severe depression). She was referred to [facility] and was started on Wellbutrin. PHQ-9 score in Aug 2022 was at 24, she did have suicidal ideations that time but no plans in place. Started on lexapro but did not take it as instructed. On Nov 2022, PHQ-9 score improved to 16. She restarted on lexapro 10mg daily that time. Patient currently notes no significant improvement but her PHQ-9 score went down to 9 today. She currently no SI/HI. States that she would have good days and bad days. Tries to maintain the good days. Her appetite is still poor, tries to use marijuana to help with her appetite. She states that her partner notices that she would get restless at times. She refuses to see a counselor at this time. She will try when she is ready. Plan to switch patient to venlafaxine 37.5mg daily, will decrease lexapro to 5mg for 2 weeks then discontinue. Will reassess in 2 weeks. Hypertension - diagnosed since her teenage years, has been on amlodipine since diagnosis. Was lost to follow-up before has not been taking it for a while. I restarted her back on it during our initial encounter. She has been compliant initially.   She not been taking since early this year but BP has been controlled since last visit. Discontinued on previous visit. BP continues to be controlled without medications. Will continue to monitor off meds. Substance use - uses vape and marijuana daily. She expressed wanting to stop vape use. Will start nicotine patch 21mg/24h for 6 weeks and will taper. Obesity - BMI 40.92 kg/m2. Discussed lifestyle modifications. Seasonal allergies/upper airway cough - She follows with Dr. Galina Martin (allergist). On flonase and singulair. Folliculitis on perineal area - Patient noticed painful lesions on her perineal area, with drainage previously. New active lesion on a different site. Saw Dr Elizabeth Zimmerman recently and was prescribed with benzoyl peroxide wash, clindamycin and doxycycline. Review of Systems   Constitutional:  Negative for activity change, appetite change, chills, fatigue and fever. HENT:  Negative for congestion, sore throat and voice change. Eyes:  Negative for visual disturbance. Respiratory:  Negative for cough, chest tightness and shortness of breath. Gastrointestinal:  Negative for abdominal pain, blood in stool, constipation, diarrhea and vomiting. Endocrine: Negative for cold intolerance, heat intolerance, polydipsia and polyphagia. Genitourinary: Negative. Musculoskeletal:  Negative for arthralgias and back pain. Skin: Negative. Neurological:  Negative for tremors, weakness, numbness and headaches. Psychiatric/Behavioral:  Positive for sleep disturbance. Negative for behavioral problems, confusion, decreased concentration, hallucinations, self-injury and suicidal ideas. The patient is nervous/anxious.       Outpatient Medications Marked as Taking for the 1/16/23 encounter (Office Visit) with Pippa Norris MD   Medication Sig Dispense Refill    montelukast (SINGULAIR) 10 MG tablet take 1 tablet by mouth at bedtime      BENZOYL PEROXIDE 10 % external wash WASH DAILY      clindamycin (CLEOCIN T) 1 % lotion apply topically to affected area twice a day      doxycycline hyclate (VIBRAMYCIN) 100 MG capsule take 1 capsule by mouth twice a day take with water DO NOT take at bedtime      escitalopram (LEXAPRO) 5 MG tablet Take 1 tablet by mouth nightly 15 tablet 0    nicotine (NICODERM CQ) 21 MG/24HR Place 1 patch onto the skin daily 42 patch 0    venlafaxine (EFFEXOR XR) 37.5 MG extended release capsule Take 1 capsule by mouth daily 30 capsule 3    melatonin (RA MELATONIN) 3 MG TABS tablet Take 1 tablet by mouth nightly 90 tablet 1    fluticasone (FLONASE) 50 MCG/ACT nasal spray 1 spray by Each Nostril route daily Dr nuñez         OBJECTIVE:    VS:   /85 (Site: Right Upper Arm, Position: Sitting, Cuff Size: Medium Adult)   Pulse 88   Temp 97.6 °F (36.4 °C) (Temporal)   Resp 18   Ht 5' 3\" (1.6 m)   Wt 247 lb (112 kg)   LMP 01/16/2023 (Exact Date)   SpO2 99% Comment: room air  BMI 43.75 kg/m²     EXAM:  Physical Exam  Constitutional:       Appearance: Normal appearance. She is obese. HENT:      Head: Normocephalic and atraumatic. Right Ear: External ear normal.      Left Ear: External ear normal.      Nose: Nose normal.      Mouth/Throat:      Mouth: Mucous membranes are moist.      Pharynx: Oropharynx is clear. Eyes:      Extraocular Movements: Extraocular movements intact. Conjunctiva/sclera: Conjunctivae normal.      Pupils: Pupils are equal, round, and reactive to light. Cardiovascular:      Rate and Rhythm: Normal rate and regular rhythm. Pulses: Normal pulses. Heart sounds: Normal heart sounds. Pulmonary:      Effort: Pulmonary effort is normal.      Breath sounds: Normal breath sounds. Abdominal:      General: Bowel sounds are normal.      Palpations: Abdomen is soft. Musculoskeletal:         General: Normal range of motion. Cervical back: Normal range of motion and neck supple. Skin:     General: Skin is warm.       Capillary Refill: Capillary refill takes less than 2 seconds. Neurological:      General: No focal deficit present. Mental Status: She is alert. Mental status is at baseline. She is disoriented. Psychiatric:         Attention and Perception: Attention and perception normal. She does not perceive auditory or visual hallucinations. Mood and Affect: Mood is depressed. Speech: Speech normal.         Behavior: Behavior normal. Behavior is cooperative. Thought Content: Thought content does not include homicidal or suicidal ideation. Thought content does not include homicidal or suicidal plan. Cognition and Memory: Cognition normal.         Judgment: Judgment normal.       ASSESSMENT/PLAN:  I have reviewed all pertinent PMH, PSH, FH, SH, medications and allergies and updated history as appropriate. Coral Vaughan was seen today for follow-up and health maintenance. Diagnoses and all orders for this visit:    Depression, unspecified depression type  -     escitalopram (LEXAPRO) 5 MG tablet; Take 1 tablet by mouth nightly  -     venlafaxine (EFFEXOR XR) 37.5 MG extended release capsule; Take 1 capsule by mouth daily  -     melatonin (RA MELATONIN) 3 MG TABS tablet; Take 1 tablet by mouth nightly    Tobacco abuse counseling  -     nicotine (NICODERM CQ) 21 MG/24HR;  Place 1 patch onto the skin daily      RTC:  To return in 2 weeks    I have reviewed my findings and recommendations with Mukund Szymanski and Dr Osiris Winslow MD PGY-2  1/16/2023 5:16 PM

## 2023-01-16 NOTE — PATIENT INSTRUCTIONS
Thank you for coming to your follow up appointment   Please take your medications as directed and keep your follow up appointment in 2 weeks. Please take lexapro 5mg daily for 15 days then stop. Start taking venlafaxine (Effexor) 37.5mg daily. We will reassess in 2 weeks.     Call our office if you have any questions or concerns at [phone]    Jomar Yung MD

## 2023-02-23 ENCOUNTER — OFFICE VISIT (OUTPATIENT)
Dept: INTERNAL MEDICINE | Age: 23
End: 2023-02-23
Payer: COMMERCIAL

## 2023-02-23 VITALS
OXYGEN SATURATION: 99 % | DIASTOLIC BLOOD PRESSURE: 86 MMHG | RESPIRATION RATE: 18 BRPM | HEART RATE: 83 BPM | TEMPERATURE: 97.3 F | HEIGHT: 63 IN | BODY MASS INDEX: 44.14 KG/M2 | SYSTOLIC BLOOD PRESSURE: 125 MMHG | WEIGHT: 249.1 LBS

## 2023-02-23 DIAGNOSIS — F32.A DEPRESSION, UNSPECIFIED DEPRESSION TYPE: Primary | ICD-10-CM

## 2023-02-23 DIAGNOSIS — N92.0 MENORRHAGIA WITH REGULAR CYCLE: ICD-10-CM

## 2023-02-23 PROCEDURE — G8417 CALC BMI ABV UP PARAM F/U: HCPCS

## 2023-02-23 PROCEDURE — 99214 OFFICE O/P EST MOD 30 MIN: CPT

## 2023-02-23 PROCEDURE — 99212 OFFICE O/P EST SF 10 MIN: CPT

## 2023-02-23 PROCEDURE — 1036F TOBACCO NON-USER: CPT

## 2023-02-23 PROCEDURE — 3079F DIAST BP 80-89 MM HG: CPT

## 2023-02-23 PROCEDURE — G8427 DOCREV CUR MEDS BY ELIG CLIN: HCPCS

## 2023-02-23 PROCEDURE — G8482 FLU IMMUNIZE ORDER/ADMIN: HCPCS

## 2023-02-23 PROCEDURE — 3074F SYST BP LT 130 MM HG: CPT

## 2023-02-23 RX ORDER — VENLAFAXINE HYDROCHLORIDE 75 MG/1
75 CAPSULE, EXTENDED RELEASE ORAL DAILY
Qty: 30 CAPSULE | Refills: 1 | Status: SHIPPED | OUTPATIENT
Start: 2023-02-23 | End: 2023-04-24

## 2023-02-23 RX ORDER — SULFAMETHOXAZOLE AND TRIMETHOPRIM 800; 160 MG/1; MG/1
TABLET ORAL
COMMUNITY
Start: 2023-02-14

## 2023-02-23 ASSESSMENT — ENCOUNTER SYMPTOMS
VOMITING: 0
CHEST TIGHTNESS: 0
ABDOMINAL PAIN: 0
NAUSEA: 0
SHORTNESS OF BREATH: 0
COUGH: 0
SORE THROAT: 0
CONSTIPATION: 0
WHEEZING: 0

## 2023-02-23 NOTE — PATIENT INSTRUCTIONS
Dear Jesus Sender,        Thank you for coming to your appointment today. I hope we have addressed all of your needs. Please make sure to do the following:  Continue your medications as listed. In this visit:  I increased your venlafaxine dose to 75 mg daily  I encouraged you to consider some therapy sessions to augument the medications  Get ordered labs drawn before your next follow up with your PCP. Continue to exercise to maintain good health      Other Follow-Ups:    Future Appointments   Date Time Provider Garrett Valle   3/29/2023 10:00 AM Jacoby Raymond MD [facility]       If a referral was placed on your behalf during your visit, you should expect to receive information about the date and time of your referral appointment within 7-10 days. If not, please call the office at [phone] and ask to speak to the . Should you have further questions in regards to this visit, you can review your clinical note and after visit summary document on your My [address] account. Other questions can be directed to our nurse line at [phone]. Discharge instruction reviewed with Patient. Patient verbalizes understanding.       Sincerely,  Param Bradley M.D PGY-1  2/23/2023  9:53 AM T

## 2023-02-23 NOTE — PROGRESS NOTES
[facility] [address]  Internal Medicine Residency Clinic    Attending Physician Statement  I have discussed the case, including pertinent history and exam findings with the resident physician. I have seen and examined the patient and the key elements of the encounter have been performed by me. I agree with the assessment, plan and orders as documented by the resident. I have reviewed all pertinent PMHx, PSHx, FamHx, SocialHx, medications, and allergies and updated history as appropriate. Patient presents for routine follow up of medical problems. Depression  Trialed escitalopram with minimal benefit and switched to venlafaxine   Check TSH, CBC and A1c    Menorrhagia with regular cycles   Check CBC     Remainder of medical problems as per resident note.     Jolene Joaquin DO  2/23/2023 10:24 AM

## 2023-03-28 ASSESSMENT — ENCOUNTER SYMPTOMS
SORE THROAT: 0
COUGH: 0
CHEST TIGHTNESS: 0
SHORTNESS OF BREATH: 0
DIARRHEA: 0
CONSTIPATION: 0
BACK PAIN: 0
BLOOD IN STOOL: 0
ABDOMINAL PAIN: 0
VOICE CHANGE: 0
VOMITING: 0

## 2023-03-28 NOTE — PROGRESS NOTES
[facility] [address]  Internal Medicine Residency Program  [facility] Note      SUBJECTIVE:  CC: had concerns including Follow-up, Depression (Patient states she feels about the same.  ), and Exposure to STD (Patient states her fimariana has Herpes and she has been safe with him. Patient would like to be tested for all STD's. Patient states she has had HPV in the past.  ). HPI:  Mahin Paez is a 25 y. o.female with PMH of essential hypertension, depression/anxiety, PTSD, seasonal allergies  presenting to [facility] for a 5 week follow-up. She was last seen on 11/7/22 for a routine visit. Since last visit, she has been doing ok. No active complaints at this time. Concerns of Std. Tami had herpes when he was young. Depression/Anxiety/PTSD - previously seen a psychiatrist in the past but was lost to follow-up since her psychiatrist moved. On our initial encounter, she endorsed decreased appetite, feeling down, depressed and isolated since COVID quarantine began. PHQ-9 score was 16 at that time (moderately severe depression). She was referred to [facility] and was started on Wellbutrin. PHQ-9 score in Aug 2022 was at 24, she did have suicidal ideations that time but no plans in place. She was on lexapro at one point but no noted improvement. She was switched on venlafaxine, dose was increased on previous visit to 75mg daily. She states she does not feel that the medication is helping but noted more of her good days recently. She has been going to the gym more often and as been walking outside more. PHQ-9 score improved to 8 today. States that her partner gives her enough support and is moving with her next month. She defers being referred to a counselor for now, states that she has been managing okay at this time. Will continue current regimen. Advised to continue exercising and walking outside. Hypertension - diagnosed since her teenage years, was previously on amlodipine.  Currently controlled without

## 2023-03-29 ENCOUNTER — HOSPITAL ENCOUNTER (OUTPATIENT)
Age: 23
Discharge: HOME | End: 2023-03-29

## 2023-03-29 ENCOUNTER — OFFICE VISIT (OUTPATIENT)
Dept: INTERNAL MEDICINE | Age: 23
End: 2023-03-29

## 2023-03-29 VITALS
WEIGHT: 251.6 LBS | RESPIRATION RATE: 18 BRPM | TEMPERATURE: 97.3 F | HEART RATE: 95 BPM | BODY MASS INDEX: 44.58 KG/M2 | OXYGEN SATURATION: 98 % | SYSTOLIC BLOOD PRESSURE: 130 MMHG | DIASTOLIC BLOOD PRESSURE: 88 MMHG | HEIGHT: 63 IN

## 2023-03-29 DIAGNOSIS — F32.A DEPRESSION, UNSPECIFIED DEPRESSION TYPE: ICD-10-CM

## 2023-03-29 DIAGNOSIS — I10 ESSENTIAL HYPERTENSION: ICD-10-CM

## 2023-03-29 DIAGNOSIS — J30.2 SEASONAL ALLERGIC RHINITIS, UNSPECIFIED TRIGGER: ICD-10-CM

## 2023-03-29 DIAGNOSIS — Z72.0 CURRENT EVERY DAY NICOTINE VAPING: ICD-10-CM

## 2023-03-29 DIAGNOSIS — Z11.3 SCREEN FOR STD (SEXUALLY TRANSMITTED DISEASE): ICD-10-CM

## 2023-03-29 DIAGNOSIS — F32.A DEPRESSION, UNSPECIFIED DEPRESSION TYPE: Primary | ICD-10-CM

## 2023-03-29 DIAGNOSIS — N92.0 MENORRHAGIA WITH REGULAR CYCLE: ICD-10-CM

## 2023-03-29 DIAGNOSIS — E66.01 CLASS 3 SEVERE OBESITY DUE TO EXCESS CALORIES WITHOUT SERIOUS COMORBIDITY WITH BODY MASS INDEX (BMI) OF 40.0 TO 44.9 IN ADULT (HCC): ICD-10-CM

## 2023-03-29 PROCEDURE — 3078F DIAST BP <80 MM HG: CPT

## 2023-03-29 PROCEDURE — 3074F SYST BP LT 130 MM HG: CPT

## 2023-03-29 PROCEDURE — 99212 OFFICE O/P EST SF 10 MIN: CPT

## 2023-03-29 PROCEDURE — 99213 OFFICE O/P EST LOW 20 MIN: CPT

## 2023-03-29 RX ORDER — VENLAFAXINE HYDROCHLORIDE 75 MG/1
75 CAPSULE, EXTENDED RELEASE ORAL DAILY
Qty: 90 CAPSULE | Refills: 1 | Status: SHIPPED | OUTPATIENT
Start: 2023-03-29

## 2023-03-29 ASSESSMENT — PATIENT HEALTH QUESTIONNAIRE - PHQ9
SUM OF ALL RESPONSES TO PHQ QUESTIONS 1-9: 8
4. FEELING TIRED OR HAVING LITTLE ENERGY: 1
SUM OF ALL RESPONSES TO PHQ QUESTIONS 1-9: 8
SUM OF ALL RESPONSES TO PHQ QUESTIONS 1-9: 8
1. LITTLE INTEREST OR PLEASURE IN DOING THINGS: 2
3. TROUBLE FALLING OR STAYING ASLEEP: 1
2. FEELING DOWN, DEPRESSED OR HOPELESS: 1
7. TROUBLE CONCENTRATING ON THINGS, SUCH AS READING THE NEWSPAPER OR WATCHING TELEVISION: 0
5. POOR APPETITE OR OVEREATING: 2
9. THOUGHTS THAT YOU WOULD BE BETTER OFF DEAD, OR OF HURTING YOURSELF: 0
10. IF YOU CHECKED OFF ANY PROBLEMS, HOW DIFFICULT HAVE THESE PROBLEMS MADE IT FOR YOU TO DO YOUR WORK, TAKE CARE OF THINGS AT HOME, OR GET ALONG WITH OTHER PEOPLE: 1
6. FEELING BAD ABOUT YOURSELF - OR THAT YOU ARE A FAILURE OR HAVE LET YOURSELF OR YOUR FAMILY DOWN: 1
8. MOVING OR SPEAKING SO SLOWLY THAT OTHER PEOPLE COULD HAVE NOTICED. OR THE OPPOSITE, BEING SO FIGETY OR RESTLESS THAT YOU HAVE BEEN MOVING AROUND A LOT MORE THAN USUAL: 0
SUM OF ALL RESPONSES TO PHQ QUESTIONS 1-9: 8
SUM OF ALL RESPONSES TO PHQ9 QUESTIONS 1 & 2: 3

## 2023-03-29 NOTE — PATIENT INSTRUCTIONS
Thank you for coming to your follow up appointment   Please take your medications as directed and keep your follow up appointment in 3 months. Continue venlafaxine 75mg daily. Continue exercise and daily walks. Call our office if you have any questions or concerns at [phone]  Have blood work done prior to next appointment.     Jocelyn Falk MD

## 2023-03-29 NOTE — PROGRESS NOTES
[facility]    Attending Physician Statement  I have discussed the case, including pertinent history and exam findings with the resident physician. I agree with the assessment, plan and orders as documented by the resident. I have reviewed all pertinent PMHx, PSHx, FamHx, SocialHx, medications, and allergies and updated history as appropriate. Patient here for routine follow up of medical problems. Patient has hx HTN in past but is currently not on meds, obesity, and depression treated with effexor. Patient feels she is doing well from this standpoint. Also has hx seasonal allergies, sees allergist. Patient also vapes with nicotine and is actively trying to quit. Remainder of medical problems as per resident note.     Laith Moreira DO  3/29/2023 11:29 AM

## 2024-09-13 NOTE — PROGRESS NOTES
[facility]  [facility] Residency [facility]  [facility] Note      SUBJECTIVE:  CC: had concerns including 2 Week Follow-Up and Depression (Patient states she has been feeling okay. Patient states she still has her moments. Patient states she will still have breakdowns when she cries. ). Mark Zamudio is a 25 y.o. female who  has a past medical history of Allergic, Depression, and Hypertension. Renetta Johnston is here today for 1 month follow up for depression. Today, patient reports not being sure if her depression is improving or not. Has had SI/HI, no plans. PHQ-9 today 15  moderately severe depression; was 9 in the last visit. Patient still not ready to start psychotherapy. She was recently started on venlafaxine at 37.5 mg daily after failed trial of escitalopram.    Complains of R. thigh pain, recommends yoga     HCM  Declined COVID-19 vaccine    --------------------------------------------------------------------------------------------------------------------------------------------------------  Lab Results   Component Value Date/Time    LABA1C 5.3 10/15/2018 12:15 PM     Allergies   Allergen Reactions    Seasonal Itching     Runny nose, watery eyes      PSHx:  has a past surgical history that includes Adenoidectomy and Tonsillectomy. Fam Hx:   Family History   Problem Relation Age of Onset    High Blood Pressure Mother     Other Mother 28        pseudo tumor brain-shunt head and spine    Diabetes Father     High Blood Pressure Sister     Cancer Maternal Grandmother         kidney    Cancer Paternal Grandmother         Review of Systems   Constitutional:  Positive for appetite change and fatigue. Negative for activity change, chills, diaphoresis, fever and unexpected weight change. HENT:  Negative for congestion and sore throat. Respiratory:  Negative for cough, chest tightness, shortness of breath and wheezing.     Cardiovascular:  Negative for chest pain and palpitations. Gastrointestinal:  Negative for abdominal pain, constipation, nausea and vomiting. Endocrine: Negative for cold intolerance, heat intolerance, polydipsia, polyphagia and polyuria. Neurological:  Negative for weakness and headaches. Psychiatric/Behavioral:  Positive for self-injury, sleep disturbance and suicidal ideas. Negative for confusion and hallucinations. The patient is not nervous/anxious. Current Outpatient Medications on File Prior to Visit   Medication Sig Dispense Refill    sulfamethoxazole-trimethoprim (BACTRIM DS;SEPTRA DS) 800-160 MG per tablet take 1 tablet by mouth twice a day with food and WATER      montelukast (SINGULAIR) 10 MG tablet take 1 tablet by mouth at bedtime      BENZOYL PEROXIDE 10 % external wash WASH DAILY      clindamycin (CLEOCIN T) 1 % lotion apply topically to affected area twice a day      nicotine (NICODERM CQ) 21 MG/24HR Place 1 patch onto the skin daily 42 patch 0    fluticasone (FLONASE) 50 MCG/ACT nasal spray 1 spray by Each Nostril route daily Dr nuñez       No current facility-administered medications on file prior to visit. I have reviewed all pertinent PMHx, PSHx, FamHx, SocialHx, medications, and allergies and updated history as appropriate. OBJECTIVE:    VS: /86 (Site: Right Upper Arm, Position: Sitting, Cuff Size: Large Adult)   Pulse 83   Temp 97.3 °F (36.3 °C) (Temporal)   Resp 18   Ht 5' 3\" (1.6 m)   Wt 249 lb 1.6 oz (113 kg)   LMP 02/07/2023 (Approximate)   SpO2 99% Comment: room air  BMI 44.13 kg/m²     Physical Exam  Constitutional:       General: She is not in acute distress. Appearance: Normal appearance. She is not ill-appearing. HENT:      Head: Normocephalic and atraumatic. Nose: No congestion or rhinorrhea. Eyes:      General: No scleral icterus. Conjunctiva/sclera: Conjunctivae normal.   Cardiovascular:      Rate and Rhythm: Normal rate and regular rhythm. Pulses: Normal pulses. Heart sounds: Normal heart sounds. Pulmonary:      Effort: Pulmonary effort is normal.      Breath sounds: Normal breath sounds. No wheezing. Abdominal:      General: There is no distension. Palpations: There is no mass. Tenderness: There is no abdominal tenderness. Musculoskeletal:         General: No swelling. Cervical back: Neck supple. Right lower leg: No edema. Left lower leg: No edema. Skin:     Coloration: Skin is not jaundiced. Neurological:      General: No focal deficit present. Mental Status: She is alert. Mental status is at baseline. Psychiatric:         Mood and Affect: Mood normal.       Health Maintenance Due   Topic Date Due    COVID-19 Vaccine (1) Never done       ASSESSMENT/PLAN:  Depression  PHQ-9 today 15, was 9 in the last visit  Has SI, no plans  Work-up >>>TSH, TSH, CBC, A1C, BMP  Increase venlafaxine to 75 MG daily    Menorrhagia with regular cycle  Asymptomatic for anemia  Check CBC with Auto Differential; Future     RTC:  No follow-ups on file. I have reviewed my findings and recommendations with Maggie Slater and Dr Ignacio Dueñas.     Bell Sarmiento MD, MPH, PGY-1  Internal Medicine Residency  2/23/2023 6:59 PM Unknown